# Patient Record
Sex: MALE | Race: ASIAN | NOT HISPANIC OR LATINO
[De-identification: names, ages, dates, MRNs, and addresses within clinical notes are randomized per-mention and may not be internally consistent; named-entity substitution may affect disease eponyms.]

---

## 2020-02-14 ENCOUNTER — APPOINTMENT (OUTPATIENT)
Dept: DERMATOLOGY | Facility: CLINIC | Age: 50
End: 2020-02-14
Payer: MEDICAID

## 2020-02-14 ENCOUNTER — APPOINTMENT (OUTPATIENT)
Dept: FAMILY MEDICINE | Facility: CLINIC | Age: 50
End: 2020-02-14
Payer: MEDICAID

## 2020-02-14 ENCOUNTER — NON-APPOINTMENT (OUTPATIENT)
Age: 50
End: 2020-02-14

## 2020-02-14 VITALS — DIASTOLIC BLOOD PRESSURE: 70 MMHG | SYSTOLIC BLOOD PRESSURE: 100 MMHG

## 2020-02-14 VITALS
RESPIRATION RATE: 16 BRPM | HEIGHT: 69 IN | DIASTOLIC BLOOD PRESSURE: 70 MMHG | SYSTOLIC BLOOD PRESSURE: 120 MMHG | HEART RATE: 74 BPM | WEIGHT: 142 LBS | BODY MASS INDEX: 21.03 KG/M2 | OXYGEN SATURATION: 98 %

## 2020-02-14 DIAGNOSIS — Z12.11 ENCOUNTER FOR SCREENING FOR MALIGNANT NEOPLASM OF COLON: ICD-10-CM

## 2020-02-14 DIAGNOSIS — Z78.9 OTHER SPECIFIED HEALTH STATUS: ICD-10-CM

## 2020-02-14 DIAGNOSIS — B35.3 TINEA PEDIS: ICD-10-CM

## 2020-02-14 DIAGNOSIS — Z12.5 ENCOUNTER FOR SCREENING FOR MALIGNANT NEOPLASM OF PROSTATE: ICD-10-CM

## 2020-02-14 DIAGNOSIS — Z82.3 FAMILY HISTORY OF STROKE: ICD-10-CM

## 2020-02-14 PROCEDURE — 99386 PREV VISIT NEW AGE 40-64: CPT | Mod: 25

## 2020-02-14 PROCEDURE — 90471 IMMUNIZATION ADMIN: CPT

## 2020-02-14 PROCEDURE — 90715 TDAP VACCINE 7 YRS/> IM: CPT

## 2020-02-14 PROCEDURE — 99202 OFFICE O/P NEW SF 15 MIN: CPT

## 2020-02-14 PROCEDURE — 93000 ELECTROCARDIOGRAM COMPLETE: CPT | Mod: 59

## 2020-02-14 PROCEDURE — G0444 DEPRESSION SCREEN ANNUAL: CPT

## 2020-02-14 NOTE — CONSULT LETTER
[Dear  ___] : Dear  [unfilled], [Consult Letter:] : I had the pleasure of evaluating your patient, [unfilled]. [Consult Closing:] : Thank you very much for allowing me to participate in the care of this patient.  If you have any questions, please do not hesitate to contact me. [Sincerely,] : Sincerely, [FreeTextEntry2] : Anitra Martinez DO [FreeTextEntry1] : He has fairly severe onychomycosis of the toenails as well as tinea pedis.\par \par Please see that chart note for further details and treatment plan. [FreeTextEntry3] : Jerson Hardy MD\par [address], [address]\par [place] [address]\par Tel ([phone])\par Fax ([phone])\par Private line ([phone])\par

## 2020-02-14 NOTE — PHYSICAL EXAM
[Face] : Face [Ears] : Ears [Nose] : Nose [Eyelids] : Eyelids [Lips] : Lips [FreeTextEntry3] : Soles:  Diffuse mild scaling, more prominent at the volar base of all toes\par Distal onycholysis with subungual debris present on all toenails except the left second and right fourth toenail \par Subungual hemorrhage is present on the left great toenail and right fifth toenail\par Distal onycholysis extends to 1 mm from the proximal nail fold of the right third toenail

## 2020-02-14 NOTE — HISTORY OF PRESENT ILLNESS
[de-identified] : First visit for 49-year-old [place]  male referred by Anitra Martinez DO with a long history of "toenail fungus." Currently being treated with 8% ciclopirox  lacquer without improvement. [FreeTextEntry1] : Toenail fungus

## 2020-02-18 LAB
25(OH)D3 SERPL-MCNC: 29.6 NG/ML
ALBUMIN SERPL ELPH-MCNC: 4.7 G/DL
ALP BLD-CCNC: 78 U/L
ALT SERPL-CCNC: 13 U/L
ANION GAP SERPL CALC-SCNC: 11 MMOL/L
APPEARANCE: CLEAR
AST SERPL-CCNC: 16 U/L
BACTERIA: NEGATIVE
BASOPHILS # BLD AUTO: 0.06 K/UL
BASOPHILS NFR BLD AUTO: 1 %
BILIRUB SERPL-MCNC: 0.6 MG/DL
BILIRUBIN URINE: NEGATIVE
BLOOD URINE: NEGATIVE
BUN SERPL-MCNC: 10 MG/DL
CALCIUM SERPL-MCNC: 9.5 MG/DL
CHLORIDE SERPL-SCNC: 102 MMOL/L
CHOLEST SERPL-MCNC: 183 MG/DL
CHOLEST/HDLC SERPL: 4.2 RATIO
CO2 SERPL-SCNC: 28 MMOL/L
COLOR: NORMAL
CREAT SERPL-MCNC: 0.92 MG/DL
EOSINOPHIL # BLD AUTO: 0.23 K/UL
EOSINOPHIL NFR BLD AUTO: 3.8 %
ESTIMATED AVERAGE GLUCOSE: 111 MG/DL
FOLATE SERPL-MCNC: 17.2 NG/ML
GLUCOSE QUALITATIVE U: NEGATIVE
GLUCOSE SERPL-MCNC: 91 MG/DL
HBA1C MFR BLD HPLC: 5.5 %
HCT VFR BLD CALC: 44.1 %
HDLC SERPL-MCNC: 43 MG/DL
HGB BLD-MCNC: 14.6 G/DL
HYALINE CASTS: 0 /LPF
IMM GRANULOCYTES NFR BLD AUTO: 0.3 %
KETONES URINE: NEGATIVE
LDLC SERPL CALC-MCNC: 114 MG/DL
LEUKOCYTE ESTERASE URINE: NEGATIVE
LYMPHOCYTES # BLD AUTO: 2.2 K/UL
LYMPHOCYTES NFR BLD AUTO: 36.6 %
MAN DIFF?: NORMAL
MCHC RBC-ENTMCNC: 29.7 PG
MCHC RBC-ENTMCNC: 33.1 GM/DL
MCV RBC AUTO: 89.8 FL
MICROSCOPIC-UA: NORMAL
MONOCYTES # BLD AUTO: 0.32 K/UL
MONOCYTES NFR BLD AUTO: 5.3 %
NEUTROPHILS # BLD AUTO: 3.18 K/UL
NEUTROPHILS NFR BLD AUTO: 53 %
NITRITE URINE: NEGATIVE
PH URINE: 6.5
PLATELET # BLD AUTO: 275 K/UL
POTASSIUM SERPL-SCNC: 4.5 MMOL/L
PROT SERPL-MCNC: 7.5 G/DL
PROTEIN URINE: NEGATIVE
PSA SERPL-MCNC: 1.04 NG/ML
RBC # BLD: 4.91 M/UL
RBC # FLD: 12.1 %
RED BLOOD CELLS URINE: 0 /HPF
SODIUM SERPL-SCNC: 141 MMOL/L
SPECIFIC GRAVITY URINE: 1.01
SQUAMOUS EPITHELIAL CELLS: 0 /HPF
T4 FREE SERPL-MCNC: 1.4 NG/DL
TRIGL SERPL-MCNC: 130 MG/DL
TSH SERPL-ACNC: 1.65 UIU/ML
URATE SERPL-MCNC: 5.5 MG/DL
UROBILINOGEN URINE: NORMAL
VIT B12 SERPL-MCNC: 686 PG/ML
WBC # FLD AUTO: 6.01 K/UL
WHITE BLOOD CELLS URINE: 0 /HPF

## 2020-02-18 NOTE — ASSESSMENT
[FreeTextEntry1] : check blood work- blood drawn in office \par follow up with optometry/ophthalmology and dentist for routine exams\par referred to colorectal surgery for colonoscopy \par c/w diet and exercise \par Tdap given, patient tolerated well, VIS given\par \par Onychomycosis of toenails- trial if Ciclopirox, see dermatology \par \par Herpes labialis- take Valtrex as directed when needed\par \par \par

## 2020-02-18 NOTE — PHYSICAL EXAM
[No Acute Distress] : no acute distress [Well Nourished] : well nourished [Well Developed] : well developed [Well-Appearing] : well-appearing [PERRL] : pupils equal round and reactive to light [Normal Sclera/Conjunctiva] : normal sclera/conjunctiva [Normal Oropharynx] : the oropharynx was normal [Neck Supple] : was supple [Normal Appearance] : was normal in appearance [Normal TMs] : both tympanic membranes were normal [Normal] : the thyroid was normal [Clear to Auscultation] : lungs were clear to auscultation bilaterally [No Respiratory Distress] : no respiratory distress  [Regular Rhythm] : with a regular rhythm [Normal Rate] : normal rate  [No Murmur] : no murmur heard [Normal S1, S2] : normal S1 and S2 [No Carotid Bruits] : no carotid bruits [No Edema] : there was no peripheral edema [Soft] : abdomen soft [Non Tender] : non-tender [Normal Bowel Sounds] : normal bowel sounds [Normal Anterior Cervical Nodes] : no anterior cervical lymphadenopathy [Normal Posterior Cervical Nodes] : no posterior cervical lymphadenopathy [No Spinal Tenderness] : no spinal tenderness [No Rash] : no rash [Grossly Normal Strength/Tone] : grossly normal strength/tone [No Focal Deficits] : no focal deficits [Alert and Oriented x3] : oriented to person, place, and time [Normal Affect] : the affect was normal [Normal Mood] : the mood was normal [de-identified] :  exam deferred [de-identified] : yellow discoloration to bilateral toenails

## 2020-02-18 NOTE — HEALTH RISK ASSESSMENT
[No] : In the past 12 months have you used drugs other than those required for medical reasons? No [0] : 2) Feeling down, depressed, or hopeless: Not at all (0) [Smoke Detector] : smoke detector [Carbon Monoxide Detector] : carbon monoxide detector [Sunscreen] : uses sunscreen [Seat Belt] :  uses seat belt [de-identified] : exercises regularly- plays soccer [] : No [de-identified] : diet is good

## 2020-02-18 NOTE — HISTORY OF PRESENT ILLNESS
[FreeTextEntry1] : Establish care [de-identified] : \par 49 year old male presents as a new patient to establish care, for annual physical. \par \par Currently- feels well \par \par concerned as his father had CVA in [place] about 20 years ago\par \par would like to go for colon cancer screening \par \par up to date with flu vaccination \par \par reports toe nail fungus\par \par reports h/o herpes labialis\par would like script for Valtrex- has taken previously for treatment \par \par due for Tdap, agrees for vaccination \par no previous vaccination reactions

## 2020-02-22 ENCOUNTER — APPOINTMENT (OUTPATIENT)
Dept: FAMILY MEDICINE | Facility: CLINIC | Age: 50
End: 2020-02-22
Payer: MEDICAID

## 2020-02-22 VITALS
OXYGEN SATURATION: 98 % | HEIGHT: 69 IN | BODY MASS INDEX: 21.03 KG/M2 | TEMPERATURE: 100.2 F | RESPIRATION RATE: 16 BRPM | HEART RATE: 82 BPM | DIASTOLIC BLOOD PRESSURE: 80 MMHG | SYSTOLIC BLOOD PRESSURE: 110 MMHG | WEIGHT: 142 LBS

## 2020-02-22 LAB
FLUAV SPEC QL CULT: POSITIVE
FLUBV AG SPEC QL IA: NORMAL

## 2020-02-22 PROCEDURE — 87804 INFLUENZA ASSAY W/OPTIC: CPT | Mod: 59,QW

## 2020-02-22 PROCEDURE — 99214 OFFICE O/P EST MOD 30 MIN: CPT | Mod: 25

## 2020-02-22 NOTE — ASSESSMENT
[FreeTextEntry1] : take Tamiflu as directed- can hold if any side effects occur\par alternate between Tylenol and Ibuprofen for fever, myalgias\par maintain fluid hydration\par return or call if symptoms worsen or don't improve

## 2020-02-22 NOTE — HISTORY OF PRESENT ILLNESS
[FreeTextEntry8] : \par 49 year old male presents c/o fever, chills, body aches since last night\par + sinus congestion, cough, sore throat\par no nausea, vomiting, or diarrhea\par did not check temp last night, temp here today 100.2\par has been taking Advil- last taken at 5 am \par did have the Flu vaccine

## 2020-02-22 NOTE — PHYSICAL EXAM
[No Acute Distress] : no acute distress [Well Nourished] : well nourished [Well Developed] : well developed [Normal Oropharynx] : the oropharynx was normal [Normal Appearance] : was normal in appearance [Normal TMs] : both tympanic membranes were normal [Neck Supple] : was supple [No Respiratory Distress] : no respiratory distress  [Clear to Auscultation] : lungs were clear to auscultation bilaterally [Normal Rate] : normal rate  [Normal S1, S2] : normal S1 and S2 [Regular Rhythm] : with a regular rhythm [Non Tender] : non-tender [Soft] : abdomen soft [No Murmur] : no murmur heard [Normal Posterior Cervical Nodes] : no posterior cervical lymphadenopathy [Normal Anterior Cervical Nodes] : no anterior cervical lymphadenopathy [de-identified] : Bilateral nasal congestion; No sinus tenderness; Postnasal drip present [Alert and Oriented x3] : oriented to person, place, and time

## 2020-02-22 NOTE — REVIEW OF SYSTEMS
[Fever] : fever [Chills] : chills [Cough] : cough [Sore Throat] : sore throat [Chest Pain] : no chest pain [Shortness Of Breath] : no shortness of breath [Nausea] : no nausea [Abdominal Pain] : no abdominal pain [Diarrhea] : no diarrhea [Vomiting] : no vomiting [FreeTextEntry4] : sinus congestion

## 2020-05-04 ENCOUNTER — TRANSCRIPTION ENCOUNTER (OUTPATIENT)
Age: 50
End: 2020-05-04

## 2020-05-04 ENCOUNTER — APPOINTMENT (OUTPATIENT)
Dept: FAMILY MEDICINE | Facility: CLINIC | Age: 50
End: 2020-05-04
Payer: MEDICAID

## 2020-05-04 PROCEDURE — 99212 OFFICE O/P EST SF 10 MIN: CPT | Mod: 95

## 2020-05-04 RX ORDER — OSELTAMIVIR PHOSPHATE 75 MG/1
75 CAPSULE ORAL TWICE DAILY
Qty: 10 | Refills: 0 | Status: COMPLETED | COMMUNITY
Start: 2020-02-22 | End: 2020-05-04

## 2020-05-04 RX ORDER — LANCETS 30 GAUGE
EACH MISCELLANEOUS
Qty: 50 | Refills: 0 | Status: COMPLETED | COMMUNITY
Start: 2020-05-04

## 2020-05-04 NOTE — HISTORY OF PRESENT ILLNESS
[Home] : at home, [unfilled] , at the time of the visit. [Medical Office: ([facility])___] : at the medical office located in  [Patient] : the patient [FreeTextEntry8] : Patient has a flareup of his seasonal allergies with sneezing runny nose eye irritation.  He requests allergy medication.  No fever no wheezing no breathing difficulties\par \par Allegra and Astelin prescribed to be used as needed\par \par face to face with patient for 10 minutes giving counseling and discussing Covid. Time also spent before and after visit reviewing chart\par

## 2020-05-06 ENCOUNTER — APPOINTMENT (OUTPATIENT)
Dept: FAMILY MEDICINE | Facility: CLINIC | Age: 50
End: 2020-05-06
Payer: MEDICAID

## 2020-05-06 DIAGNOSIS — T78.40XA ALLERGY, UNSPECIFIED, INITIAL ENCOUNTER: ICD-10-CM

## 2020-05-06 PROCEDURE — 99441: CPT

## 2020-05-07 ENCOUNTER — APPOINTMENT (OUTPATIENT)
Dept: FAMILY MEDICINE | Facility: CLINIC | Age: 50
End: 2020-05-07
Payer: MEDICAID

## 2020-05-07 VITALS
DIASTOLIC BLOOD PRESSURE: 64 MMHG | WEIGHT: 142 LBS | HEART RATE: 79 BPM | OXYGEN SATURATION: 99 % | HEIGHT: 60 IN | RESPIRATION RATE: 16 BRPM | BODY MASS INDEX: 27.88 KG/M2 | SYSTOLIC BLOOD PRESSURE: 104 MMHG

## 2020-05-07 PROCEDURE — 99212 OFFICE O/P EST SF 10 MIN: CPT

## 2020-05-07 RX ORDER — KETOROLAC TROMETHAMINE 4 MG/ML
0.4 SOLUTION/ DROPS OPHTHALMIC 4 TIMES DAILY
Qty: 1 | Refills: 1 | Status: DISCONTINUED | COMMUNITY
Start: 2020-05-06 | End: 2020-05-07

## 2020-05-07 NOTE — HISTORY OF PRESENT ILLNESS
[FreeTextEntry8] : mimimal poison ivy right arm and left leg\par \par Betamethasone.  benedryl allegra.  Etio of PI explained

## 2020-05-15 ENCOUNTER — TRANSCRIPTION ENCOUNTER (OUTPATIENT)
Age: 50
End: 2020-05-15

## 2020-05-24 ENCOUNTER — RX RENEWAL (OUTPATIENT)
Age: 50
End: 2020-05-24

## 2020-09-29 ENCOUNTER — APPOINTMENT (OUTPATIENT)
Dept: FAMILY MEDICINE | Facility: CLINIC | Age: 50
End: 2020-09-29
Payer: MEDICAID

## 2020-09-29 DIAGNOSIS — Z23 ENCOUNTER FOR IMMUNIZATION: ICD-10-CM

## 2020-09-29 PROCEDURE — 90686 IIV4 VACC NO PRSV 0.5 ML IM: CPT

## 2020-09-29 PROCEDURE — G0008: CPT

## 2021-05-05 ENCOUNTER — APPOINTMENT (OUTPATIENT)
Dept: FAMILY MEDICINE | Facility: CLINIC | Age: 51
End: 2021-05-05
Payer: MEDICAID

## 2021-05-05 ENCOUNTER — NON-APPOINTMENT (OUTPATIENT)
Age: 51
End: 2021-05-05

## 2021-05-05 VITALS
OXYGEN SATURATION: 98 % | SYSTOLIC BLOOD PRESSURE: 110 MMHG | HEIGHT: 68 IN | WEIGHT: 149 LBS | RESPIRATION RATE: 16 BRPM | BODY MASS INDEX: 22.58 KG/M2 | DIASTOLIC BLOOD PRESSURE: 80 MMHG | HEART RATE: 78 BPM | TEMPERATURE: 98.1 F

## 2021-05-05 DIAGNOSIS — L23.7 ALLERGIC CONTACT DERMATITIS DUE TO PLANTS, EXCEPT FOOD: ICD-10-CM

## 2021-05-05 DIAGNOSIS — J10.1 INFLUENZA DUE TO OTHER IDENTIFIED INFLUENZA VIRUS WITH OTHER RESPIRATORY MANIFESTATIONS: ICD-10-CM

## 2021-05-05 PROCEDURE — 93000 ELECTROCARDIOGRAM COMPLETE: CPT

## 2021-05-05 PROCEDURE — 99072 ADDL SUPL MATRL&STAF TM PHE: CPT

## 2021-05-05 PROCEDURE — 96127 BRIEF EMOTIONAL/BEHAV ASSMT: CPT

## 2021-05-05 PROCEDURE — 99396 PREV VISIT EST AGE 40-64: CPT | Mod: 25

## 2021-05-05 RX ORDER — FEXOFENADINE HCL 180 MG/1
180 TABLET, FILM COATED ORAL
Qty: 30 | Refills: 3 | Status: DISCONTINUED | COMMUNITY
Start: 2020-05-04 | End: 2021-05-05

## 2021-05-05 RX ORDER — OLOPATADINE HCL 1 MG/ML
0.1 SOLUTION/ DROPS OPHTHALMIC TWICE DAILY
Qty: 2 | Refills: 3 | Status: DISCONTINUED | COMMUNITY
Start: 2020-05-07 | End: 2021-05-05

## 2021-05-05 RX ORDER — CICLOPIROX 80 MG/ML
8 SOLUTION TOPICAL
Qty: 1 | Refills: 3 | Status: DISCONTINUED | COMMUNITY
Start: 2020-02-14 | End: 2021-05-05

## 2021-05-05 RX ORDER — BETAMETHASONE DIPROPIONATE 0.5 MG/G
0.05 CREAM, AUGMENTED TOPICAL TWICE DAILY
Qty: 50 | Refills: 1 | Status: DISCONTINUED | COMMUNITY
Start: 2020-05-06 | End: 2021-05-05

## 2021-05-05 NOTE — PHYSICAL EXAM
[No Acute Distress] : no acute distress [Well Nourished] : well nourished [Well Developed] : well developed [Well-Appearing] : well-appearing [Normal Sclera/Conjunctiva] : normal sclera/conjunctiva [PERRL] : pupils equal round and reactive to light [Normal Outer Ear/Nose] : the outer ears and nose were normal in appearance [Normal TMs] : both tympanic membranes were normal [Normal Appearance] : was normal in appearance [Neck Supple] : was supple [Normal] : the thyroid was normal [No Respiratory Distress] : no respiratory distress  [Clear to Auscultation] : lungs were clear to auscultation bilaterally [Normal Rate] : normal rate  [Regular Rhythm] : with a regular rhythm [Normal S1, S2] : normal S1 and S2 [No Murmur] : no murmur heard [No Carotid Bruits] : no carotid bruits [No Edema] : there was no peripheral edema [Soft] : abdomen soft [Non Tender] : non-tender [Normal Bowel Sounds] : normal bowel sounds [Normal Posterior Cervical Nodes] : no posterior cervical lymphadenopathy [Normal Anterior Cervical Nodes] : no anterior cervical lymphadenopathy [No Spinal Tenderness] : no spinal tenderness [Grossly Normal Strength/Tone] : grossly normal strength/tone [No Rash] : no rash [No Focal Deficits] : no focal deficits [Alert and Oriented x3] : oriented to person, place, and time [Anxious] : anxious

## 2021-05-05 NOTE — HISTORY OF PRESENT ILLNESS
[FreeTextEntry1] : Annual physical  [de-identified] : \par 50 year old male presents for annual physical \par \par reports awakening at night to urinate- about 3 times at night\par occurring over the past few months\par does not believe he is drinking much fluid before bedtime \par \par feeling down as he has not been sleeping well\par also feels anxious at times- mainly regarding his concerns for his health \par \par having tinnitus- seen by ENT\par \par due for colonoscopy

## 2021-05-05 NOTE — HEALTH RISK ASSESSMENT
[No] : In the past 12 months have you used drugs other than those required for medical reasons? No [] : No [de-identified] : Exercises regularly [de-identified] : Diet is good

## 2021-05-05 NOTE — ASSESSMENT
[FreeTextEntry1] : Health care maintenance:\par check blood work, blood drawn in office\par follow up with optometry/ophthalmology and dentist for routine exams when due\par referred to GI for colonoscopy\par c/w diet and exercise as tolerated\par \par Nocturia:\par see urology\par \par Anxiety/Depression:\par will refer to behavioral health counselor

## 2021-05-07 LAB
25(OH)D3 SERPL-MCNC: 36.3 NG/ML
ALBUMIN SERPL ELPH-MCNC: 4.5 G/DL
ALP BLD-CCNC: 77 U/L
ALT SERPL-CCNC: 19 U/L
ANION GAP SERPL CALC-SCNC: 11 MMOL/L
APPEARANCE: CLEAR
AST SERPL-CCNC: 18 U/L
BACTERIA: NEGATIVE
BASOPHILS # BLD AUTO: 0.05 K/UL
BASOPHILS NFR BLD AUTO: 1 %
BILIRUB SERPL-MCNC: 0.7 MG/DL
BILIRUBIN URINE: NEGATIVE
BLOOD URINE: NEGATIVE
BUN SERPL-MCNC: 9 MG/DL
CALCIUM SERPL-MCNC: 9.5 MG/DL
CHLORIDE SERPL-SCNC: 100 MMOL/L
CHOLEST SERPL-MCNC: 168 MG/DL
CO2 SERPL-SCNC: 27 MMOL/L
COLOR: YELLOW
CREAT SERPL-MCNC: 0.9 MG/DL
EOSINOPHIL # BLD AUTO: 0.14 K/UL
EOSINOPHIL NFR BLD AUTO: 2.8 %
ESTIMATED AVERAGE GLUCOSE: 108 MG/DL
FOLATE SERPL-MCNC: >20 NG/ML
GLUCOSE QUALITATIVE U: NEGATIVE
GLUCOSE SERPL-MCNC: 100 MG/DL
HBA1C MFR BLD HPLC: 5.4 %
HCT VFR BLD CALC: 42.7 %
HDLC SERPL-MCNC: 56 MG/DL
HGB BLD-MCNC: 14.5 G/DL
HYALINE CASTS: 0 /LPF
IMM GRANULOCYTES NFR BLD AUTO: 0.2 %
KETONES URINE: NEGATIVE
LDLC SERPL CALC-MCNC: 97 MG/DL
LEUKOCYTE ESTERASE URINE: NEGATIVE
LYMPHOCYTES # BLD AUTO: 2.07 K/UL
LYMPHOCYTES NFR BLD AUTO: 40.8 %
MAN DIFF?: NORMAL
MCHC RBC-ENTMCNC: 29.9 PG
MCHC RBC-ENTMCNC: 34 GM/DL
MCV RBC AUTO: 88 FL
MICROSCOPIC-UA: NORMAL
MONOCYTES # BLD AUTO: 0.33 K/UL
MONOCYTES NFR BLD AUTO: 6.5 %
NEUTROPHILS # BLD AUTO: 2.47 K/UL
NEUTROPHILS NFR BLD AUTO: 48.7 %
NITRITE URINE: NEGATIVE
NONHDLC SERPL-MCNC: 112 MG/DL
PH URINE: 7.5
PLATELET # BLD AUTO: 320 K/UL
POTASSIUM SERPL-SCNC: 4.8 MMOL/L
PROT SERPL-MCNC: 7.7 G/DL
PROTEIN URINE: NORMAL
PSA SERPL-MCNC: 0.71 NG/ML
RBC # BLD: 4.85 M/UL
RBC # FLD: 12.4 %
RED BLOOD CELLS URINE: 1 /HPF
SODIUM SERPL-SCNC: 138 MMOL/L
SPECIFIC GRAVITY URINE: 1.02
SQUAMOUS EPITHELIAL CELLS: 0 /HPF
T4 FREE SERPL-MCNC: 1.7 NG/DL
TRIGL SERPL-MCNC: 74 MG/DL
TSH SERPL-ACNC: 0.96 UIU/ML
URATE SERPL-MCNC: 4.4 MG/DL
UROBILINOGEN URINE: NORMAL
VIT B12 SERPL-MCNC: 604 PG/ML
WBC # FLD AUTO: 5.07 K/UL
WHITE BLOOD CELLS URINE: 0 /HPF

## 2021-05-10 ENCOUNTER — NON-APPOINTMENT (OUTPATIENT)
Age: 51
End: 2021-05-10

## 2021-05-10 ENCOUNTER — APPOINTMENT (OUTPATIENT)
Dept: FAMILY MEDICINE | Facility: CLINIC | Age: 51
End: 2021-05-10
Payer: MEDICAID

## 2021-05-10 VITALS
HEART RATE: 83 BPM | HEIGHT: 68 IN | WEIGHT: 149 LBS | TEMPERATURE: 97.6 F | OXYGEN SATURATION: 98 % | SYSTOLIC BLOOD PRESSURE: 130 MMHG | DIASTOLIC BLOOD PRESSURE: 70 MMHG | BODY MASS INDEX: 22.58 KG/M2

## 2021-05-10 DIAGNOSIS — M54.5 LOW BACK PAIN: ICD-10-CM

## 2021-05-10 PROCEDURE — 99072 ADDL SUPL MATRL&STAF TM PHE: CPT

## 2021-05-10 PROCEDURE — 99214 OFFICE O/P EST MOD 30 MIN: CPT

## 2021-05-10 NOTE — HISTORY OF PRESENT ILLNESS
[FreeTextEntry8] : \par c/o right sided low back pain since yesterday\par started after he lifted a  at his home\par pain is non-radiating\par pain is worse with movement\par no numbness or tingling \par tried Tylenol and Ibuprofen with mild improvement\par would like to go for physical therapy- requests referral \par \par started Sertraline 25 mg recently for anxiety/depression

## 2021-05-10 NOTE — PHYSICAL EXAM
[No Acute Distress] : no acute distress [Well Nourished] : well nourished [Well Developed] : well developed [Normal Appearance] : was normal in appearance [Neck Supple] : was supple [No Respiratory Distress] : no respiratory distress  [Clear to Auscultation] : lungs were clear to auscultation bilaterally [Normal Rate] : normal rate  [Regular Rhythm] : with a regular rhythm [Normal S1, S2] : normal S1 and S2 [No Murmur] : no murmur heard [Alert and Oriented x3] : oriented to person, place, and time [No Spinal Tenderness] : no spinal tenderness [Anxious] : anxious [de-identified] : tenderness to right lower back, lumbar region

## 2021-05-10 NOTE — ASSESSMENT
[FreeTextEntry1] : Acute right sided low back pain- c/w Tylenol and Ibuprofen when needed, referred for physical therapy, follow up if no improvement \par Anxiety/Depression- recently started on Sertraline 25 mg daily, advised to allow 2 weeks to see if symptoms improve, can increase to 50 mg at that time if needed

## 2021-05-28 ENCOUNTER — APPOINTMENT (OUTPATIENT)
Dept: FAMILY MEDICINE | Facility: CLINIC | Age: 51
End: 2021-05-28
Payer: MEDICAID

## 2021-05-28 VITALS
OXYGEN SATURATION: 99 % | WEIGHT: 151 LBS | HEIGHT: 68 IN | DIASTOLIC BLOOD PRESSURE: 70 MMHG | HEART RATE: 74 BPM | SYSTOLIC BLOOD PRESSURE: 112 MMHG | TEMPERATURE: 97.7 F | BODY MASS INDEX: 22.88 KG/M2

## 2021-05-28 PROCEDURE — 99213 OFFICE O/P EST LOW 20 MIN: CPT

## 2021-05-28 NOTE — PHYSICAL EXAM
[No Acute Distress] : no acute distress [Well Nourished] : well nourished [Well Developed] : well developed [Well-Appearing] : well-appearing [Normal Appearance] : was normal in appearance [Neck Supple] : was supple [No Respiratory Distress] : no respiratory distress  [Alert and Oriented x3] : oriented to person, place, and time [Normal Mood] : the mood was normal

## 2021-05-28 NOTE — ASSESSMENT
[FreeTextEntry1] : Anxiety/Depression- stable, c/w Sertraline 25 mg daily x 2-3 months, advised to f/u in 2 months to reassess, if patient would like to wean off of medication at that time, advised would take medication every other day x 1 week, then every 3 days x 1 week, then stop

## 2021-05-28 NOTE — HISTORY OF PRESENT ILLNESS
[FreeTextEntry1] : Follow up [de-identified] : \par 51 year old male presents for a follow up\par \par Reports his mood has improved since starting Sertraline 25 mg daily\par feels well\par wanted to discuss how long he should stay on medication

## 2021-06-09 ENCOUNTER — APPOINTMENT (OUTPATIENT)
Dept: FAMILY MEDICINE | Facility: CLINIC | Age: 51
End: 2021-06-09
Payer: MEDICAID

## 2021-06-09 VITALS
HEIGHT: 68 IN | BODY MASS INDEX: 23.04 KG/M2 | OXYGEN SATURATION: 98 % | WEIGHT: 152 LBS | RESPIRATION RATE: 16 BRPM | SYSTOLIC BLOOD PRESSURE: 100 MMHG | DIASTOLIC BLOOD PRESSURE: 72 MMHG | TEMPERATURE: 97.2 F | HEART RATE: 70 BPM

## 2021-06-09 PROCEDURE — 99213 OFFICE O/P EST LOW 20 MIN: CPT

## 2021-06-09 NOTE — HISTORY OF PRESENT ILLNESS
[FreeTextEntry8] : \par this morning- awoke with dizziness, felt like the room was spinning\par no head injury or trauma\par no headache\par has some associated nausea\par \par has ringing to his right ear\par has seen ENT\par had recent imaging to his head- MRI which he reports was normal\par scheduled to go for MRA

## 2021-06-09 NOTE — ASSESSMENT
[FreeTextEntry1] : Likely with benign positional vertigo \par Recommend Epley maneuvers\par Trial of Meclizine, take as directed at bedtime due to drowsiness, not to drive while on medication \par advised to change positions slowly, avoid sudden movements\par f/u with ENT if no improvement

## 2021-06-09 NOTE — PHYSICAL EXAM
[No Acute Distress] : no acute distress [Well Nourished] : well nourished [Well Developed] : well developed [Normal Sclera/Conjunctiva] : normal sclera/conjunctiva [PERRL] : pupils equal round and reactive to light [Normal TMs] : both tympanic membranes were normal [Normal Appearance] : was normal in appearance [Neck Supple] : was supple [No Respiratory Distress] : no respiratory distress  [Clear to Auscultation] : lungs were clear to auscultation bilaterally [Normal Rate] : normal rate  [Regular Rhythm] : with a regular rhythm [Normal S1, S2] : normal S1 and S2 [No Murmur] : no murmur heard [No Edema] : there was no peripheral edema [No Focal Deficits] : no focal deficits [Alert and Oriented x3] : oriented to person, place, and time

## 2021-06-18 ENCOUNTER — APPOINTMENT (OUTPATIENT)
Dept: UROLOGY | Facility: CLINIC | Age: 51
End: 2021-06-18

## 2021-06-23 ENCOUNTER — APPOINTMENT (OUTPATIENT)
Dept: FAMILY MEDICINE | Facility: CLINIC | Age: 51
End: 2021-06-23
Payer: MEDICAID

## 2021-06-23 VITALS
BODY MASS INDEX: 23.04 KG/M2 | HEART RATE: 67 BPM | WEIGHT: 152 LBS | SYSTOLIC BLOOD PRESSURE: 104 MMHG | HEIGHT: 68 IN | DIASTOLIC BLOOD PRESSURE: 72 MMHG | TEMPERATURE: 97.3 F | OXYGEN SATURATION: 99 %

## 2021-06-23 PROCEDURE — 99213 OFFICE O/P EST LOW 20 MIN: CPT

## 2021-06-23 NOTE — PHYSICAL EXAM
[No Acute Distress] : no acute distress [Well Nourished] : well nourished [Well Developed] : well developed [Normal Appearance] : was normal in appearance [Neck Supple] : was supple [No Respiratory Distress] : no respiratory distress  [No Spinal Tenderness] : no spinal tenderness [Grossly Normal Strength/Tone] : grossly normal strength/tone [No Rash] : no rash [Alert and Oriented x3] : oriented to person, place, and time [de-identified] : tenderness and muscle tightness to left posterior neck [de-identified] : mild tenderness and muscle tightness to left posterior neck and mid back- thoracic region

## 2021-06-23 NOTE — ASSESSMENT
[FreeTextEntry1] : posterior neck and back pain- likely muscular in etiology\par advised to take 600 mg of Advil every 6 hours as needed with food for pain control\par apply heat to affected areas\par will refer patient for x-rays\par will refer patient for physical therapy\par return or call if symptoms worsen or don't improve

## 2021-06-26 ENCOUNTER — APPOINTMENT (OUTPATIENT)
Dept: RADIOLOGY | Facility: CLINIC | Age: 51
End: 2021-06-26

## 2021-06-26 ENCOUNTER — RESULT REVIEW (OUTPATIENT)
Age: 51
End: 2021-06-26

## 2021-06-26 ENCOUNTER — OUTPATIENT (OUTPATIENT)
Dept: OUTPATIENT SERVICES | Facility: HOSPITAL | Age: 51
LOS: 1 days | End: 2021-06-26
Payer: MEDICAID

## 2021-06-26 DIAGNOSIS — M54.9 DORSALGIA, UNSPECIFIED: ICD-10-CM

## 2021-06-26 DIAGNOSIS — M54.2 CERVICALGIA: ICD-10-CM

## 2021-06-26 PROCEDURE — 72070 X-RAY EXAM THORAC SPINE 2VWS: CPT | Mod: 26

## 2021-06-26 PROCEDURE — 72050 X-RAY EXAM NECK SPINE 4/5VWS: CPT

## 2021-06-26 PROCEDURE — 72050 X-RAY EXAM NECK SPINE 4/5VWS: CPT | Mod: 26

## 2021-06-26 PROCEDURE — 72070 X-RAY EXAM THORAC SPINE 2VWS: CPT

## 2021-07-02 ENCOUNTER — APPOINTMENT (OUTPATIENT)
Dept: UROLOGY | Facility: CLINIC | Age: 51
End: 2021-07-02

## 2021-07-06 ENCOUNTER — APPOINTMENT (OUTPATIENT)
Dept: FAMILY MEDICINE | Facility: CLINIC | Age: 51
End: 2021-07-06
Payer: MEDICAID

## 2021-07-06 VITALS
WEIGHT: 157 LBS | DIASTOLIC BLOOD PRESSURE: 72 MMHG | HEART RATE: 57 BPM | TEMPERATURE: 97.7 F | HEIGHT: 68 IN | BODY MASS INDEX: 23.79 KG/M2 | SYSTOLIC BLOOD PRESSURE: 110 MMHG | OXYGEN SATURATION: 98 %

## 2021-07-06 DIAGNOSIS — M54.9 DORSALGIA, UNSPECIFIED: ICD-10-CM

## 2021-07-06 PROCEDURE — 99213 OFFICE O/P EST LOW 20 MIN: CPT

## 2021-07-09 ENCOUNTER — APPOINTMENT (OUTPATIENT)
Dept: PHYSICAL MEDICINE AND REHAB | Facility: CLINIC | Age: 51
End: 2021-07-09
Payer: MEDICAID

## 2021-07-09 VITALS
SYSTOLIC BLOOD PRESSURE: 109 MMHG | WEIGHT: 151 LBS | TEMPERATURE: 97 F | DIASTOLIC BLOOD PRESSURE: 71 MMHG | HEIGHT: 68 IN | RESPIRATION RATE: 14 BRPM | BODY MASS INDEX: 22.88 KG/M2 | HEART RATE: 73 BPM

## 2021-07-09 DIAGNOSIS — Z78.9 OTHER SPECIFIED HEALTH STATUS: ICD-10-CM

## 2021-07-09 PROCEDURE — 99204 OFFICE O/P NEW MOD 45 MIN: CPT

## 2021-07-09 RX ORDER — METHYLPREDNISOLONE 4 MG/1
4 TABLET ORAL
Qty: 1 | Refills: 0 | Status: DISCONTINUED | COMMUNITY
Start: 2021-07-06 | End: 2021-07-09

## 2021-07-09 RX ORDER — AZELASTINE HYDROCHLORIDE 137 UG/1
137 SPRAY, METERED NASAL TWICE DAILY
Qty: 1 | Refills: 2 | Status: DISCONTINUED | COMMUNITY
Start: 2020-05-04 | End: 2021-07-09

## 2021-07-09 RX ORDER — VALACYCLOVIR 1 G/1
1 TABLET, FILM COATED ORAL TWICE DAILY
Qty: 20 | Refills: 2 | Status: DISCONTINUED | COMMUNITY
Start: 2020-02-14 | End: 2021-07-09

## 2021-07-09 RX ORDER — MECLIZINE HYDROCHLORIDE 25 MG/1
25 TABLET ORAL
Qty: 14 | Refills: 0 | Status: DISCONTINUED | COMMUNITY
Start: 2021-06-09 | End: 2021-07-09

## 2021-07-09 NOTE — HISTORY OF PRESENT ILLNESS
[FreeTextEntry1] : 51 y.o. DARI DRUMMOND presents to office w/ c/o neck pain x 2 weeks.  Pt. denies antecedent trauma or injury to C-spine.  No pain radiating into left shoulder or arm.  Denies N/T in fingers.  PMD ordered C-spine x-ray reviewed below.  Pt. started taking oral prednisone.  Pt. states that he had one session of P.T.  No injections.

## 2021-07-09 NOTE — ASSESSMENT
[FreeTextEntry1] : 51 y.o. M w/ lower cervical axial pain suspicious for C8-T1 interspinous ligament sprain.  I spent most of today's visit (30 min) reviewing the patient's x-rays, discussing pathogenesis and further non-operative management.  Advised pt. to D/C oral steroids 2' lack of clinical efficacy and side effect profile.  Advised trial of OTC Voltaren gel as directed.  Rx P.T. for modalities, gentle ROM, stretching and strengthening exercises.  May consider US guided C8-T1 interspinous ligament injection if pt. is unable to advance his reheb program.  Pt. is in agreement with plan.  All questions answered.  RTC 6-8 weeks.

## 2021-07-09 NOTE — DATA REVIEWED
[Plain X-Rays] : plain X-Rays [FreeTextEntry1] : C-spine X-rays (6/26/21): No compression fractures or spondylolistheses.  Preserved intervertebral disc spaces.\par Narrowed bilateral C3-C4 and left C4-C5 neural foramina. Patent remaining visualized bilateral cervical neural foramina.  Prevertebral soft tissues and predental interval within normal limits.  Atlantoaxial and posterior facet alignment maintained.  Intact odontoid.  No discrete lytic or blastic lesions.

## 2021-07-09 NOTE — PHYSICAL EXAM
[FreeTextEntry1] : NAD\par A&Ox3\par Non-obese\par C-spine ROM: 1 FB chin-to-sternum; 20' extension; 45' LR either side\par Black's: neg\par Lhermitte's: neg\par Spurling's: neg\par DTR's: 1+ b/l B/T/Br\par MMT: 5/5 b/l UE\par Sensation: SILT\par Palpation: TTP C7-T1 INTERSPINOUS LIGAMENT; no discrete cervical myofascial trigger points\par

## 2021-07-15 PROBLEM — M54.9 ACUTE BACK PAIN: Status: ACTIVE | Noted: 2021-06-23

## 2021-07-15 NOTE — HISTORY OF PRESENT ILLNESS
[FreeTextEntry8] : \par remains with left sided neck and upper back pain\par went for a session of physical therapy- did not feel much of an improvement \par

## 2021-07-15 NOTE — PHYSICAL EXAM
[No Acute Distress] : no acute distress [Well Nourished] : well nourished [Well Developed] : well developed [Normal Appearance] : was normal in appearance [Neck Supple] : was supple [No Respiratory Distress] : no respiratory distress  [No Spinal Tenderness] : no spinal tenderness [No Rash] : no rash [Alert and Oriented x3] : oriented to person, place, and time [de-identified] : tenderness and muscle tightness to left posterior neck

## 2021-07-15 NOTE — ASSESSMENT
[FreeTextEntry1] : reviewed x-ray results\par trial of Medrol dose pack\par will refer patient to physiatry/orthopedic sports medicine specialist for further evaluation

## 2021-07-16 ENCOUNTER — APPOINTMENT (OUTPATIENT)
Dept: PHYSICAL MEDICINE AND REHAB | Facility: CLINIC | Age: 51
End: 2021-07-16
Payer: MEDICAID

## 2021-07-16 VITALS
BODY MASS INDEX: 23.34 KG/M2 | HEIGHT: 68 IN | TEMPERATURE: 97 F | SYSTOLIC BLOOD PRESSURE: 108 MMHG | RESPIRATION RATE: 14 BRPM | DIASTOLIC BLOOD PRESSURE: 70 MMHG | HEART RATE: 72 BPM | WEIGHT: 154 LBS

## 2021-07-16 PROCEDURE — 99214 OFFICE O/P EST MOD 30 MIN: CPT

## 2021-07-16 NOTE — HISTORY OF PRESENT ILLNESS
[FreeTextEntry1] : 51 y.o. M w/ h/o lower cervical axial pain suspicious for C8-T1 interspinous ligament sprain returns to office for f/u.  Pt. reports decreased neck pain but still has AM stiffness.  He describes tingling sensations over dorsum left hand.  + nocturnal exacerbations.  Denies radiating pain down left arm.

## 2021-07-16 NOTE — ASSESSMENT
[FreeTextEntry1] : 51 y.o. M w/ lower cervical axial pain suspicious for C8-T1 interspinous ligament sprain clinically improving now w/ N/T left hand.  I spent most of today's visit (25 min) discussing pathogenesis and further non-operative management.  Advised pt. to start P.T. for modalities, gentle ROM, stretching and strengthening exercises.  No need for US guided C8-T1 interspinous ligament injection now.  Discussed EMG/NCS LEFT UE to r/o peripheral entrapment neuropathy vs. cervical radiculopathy which patient has deferred.   All questions answered.  RTC 6-8 weeks.

## 2021-07-16 NOTE — PHYSICAL EXAM
[FreeTextEntry1] : NAD\par A&Ox3\par Non-obese\par No significant change in today's examination\par C-spine ROM: 1 FB chin-to-sternum; 20' extension; 45' LR either side\par Black's: neg\par Lhermitte's: neg\par Spurling's: neg\par DTR's: 1+ b/l B/T/Br\par MMT: 5/5 b/l UE including intrinsic muscles\par Sensation: SILT.  No decrement to PP testing C6-8 dermatomes\par Palpation: TTP C7-T1 INTERSPINOUS LIGAMENT; no discrete cervical myofascial trigger points\par Wrists\par No thenar muscle atrophy\par Phalen's neg\par Tinel's neg volar wrist and medial elbow

## 2021-07-19 ENCOUNTER — APPOINTMENT (OUTPATIENT)
Dept: FAMILY MEDICINE | Facility: CLINIC | Age: 51
End: 2021-07-19
Payer: MEDICAID

## 2021-07-19 VITALS
BODY MASS INDEX: 23.34 KG/M2 | HEART RATE: 71 BPM | HEIGHT: 68 IN | DIASTOLIC BLOOD PRESSURE: 72 MMHG | WEIGHT: 154 LBS | SYSTOLIC BLOOD PRESSURE: 110 MMHG | OXYGEN SATURATION: 98 % | TEMPERATURE: 97.9 F

## 2021-07-19 DIAGNOSIS — R20.0 ANESTHESIA OF SKIN: ICD-10-CM

## 2021-07-19 DIAGNOSIS — R20.2 ANESTHESIA OF SKIN: ICD-10-CM

## 2021-07-19 PROCEDURE — 99213 OFFICE O/P EST LOW 20 MIN: CPT

## 2021-07-19 NOTE — PHYSICAL EXAM
[No Acute Distress] : no acute distress [Well Nourished] : well nourished [Well Developed] : well developed [Normal Appearance] : was normal in appearance [Neck Supple] : was supple [No Respiratory Distress] : no respiratory distress  [No Spinal Tenderness] : no spinal tenderness [Grossly Normal Strength/Tone] : grossly normal strength/tone [Alert and Oriented x3] : oriented to person, place, and time [de-identified] : tenderness and muscle tightness to left poster neck

## 2021-07-19 NOTE — HISTORY OF PRESENT ILLNESS
[FreeTextEntry8] : \par c/o tingling sensation to his left hand as well as to the side of his left foot for the past 4-5 days\par remains with left sided neck pain\par has seen physiatry- recommended to go for physical therapy, EMG studies \par \par remains with ringing to his ears\par requests referral for ENT

## 2021-07-19 NOTE — ASSESSMENT
[FreeTextEntry1] : As patient remains with neck, upper back pain and c/o tingling sensation to his left hand and side of left foot, will refer patient for MRI of cervical spine\par advised to f/u with physiatry for EMG studies\par \par referral given for ENT for tinnitus \par

## 2021-07-26 ENCOUNTER — APPOINTMENT (OUTPATIENT)
Dept: UROLOGY | Facility: CLINIC | Age: 51
End: 2021-07-26
Payer: MEDICAID

## 2021-07-26 VITALS
SYSTOLIC BLOOD PRESSURE: 126 MMHG | TEMPERATURE: 98.1 F | HEART RATE: 68 BPM | DIASTOLIC BLOOD PRESSURE: 79 MMHG | WEIGHT: 154 LBS | HEIGHT: 68 IN | BODY MASS INDEX: 23.34 KG/M2

## 2021-07-26 PROCEDURE — 99204 OFFICE O/P NEW MOD 45 MIN: CPT

## 2021-07-26 PROCEDURE — 51798 US URINE CAPACITY MEASURE: CPT

## 2021-07-26 NOTE — PHYSICAL EXAM
[General Appearance - Well Developed] : well developed [General Appearance - Well Nourished] : well nourished [Normal Appearance] : normal appearance [Well Groomed] : well groomed [General Appearance - In No Acute Distress] : no acute distress [Edema] : no peripheral edema [Respiration, Rhythm And Depth] : normal respiratory rhythm and effort [Exaggerated Use Of Accessory Muscles For Inspiration] : no accessory muscle use [Abdomen Soft] : soft [Costovertebral Angle Tenderness] : no ~M costovertebral angle tenderness [Abdomen Tenderness] : non-tender [Urethral Meatus] : meatus normal [Penis Abnormality] : normal circumcised penis [Urinary Bladder Findings] : the bladder was normal on palpation [Scrotum] : the scrotum was normal [Testes Tenderness] : no tenderness of the testes [Testes Mass (___cm)] : there were no testicular masses [No Prostate Nodules] : no prostate nodules [Prostate Size ___ gm] : prostate size [unfilled] gm [Normal Station and Gait] : the gait and station were normal for the patient's age [] : no rash [No Focal Deficits] : no focal deficits [Oriented To Time, Place, And Person] : oriented to person, place, and time [Mood] : the mood was normal [Affect] : the affect was normal [Not Anxious] : not anxious [No Palpable Adenopathy] : no palpable adenopathy

## 2021-07-26 NOTE — ASSESSMENT
[FreeTextEntry1] : 51 year old M  with nocturia. For nocturia, we discussed that nocturia can be the result of nocturnal polyuria, global polyuria, or diminished bladder capacity either globally or nocturnally. The underlying cause of nocturia can be difficult to discern but include conditions that cause lower extremity edema such as CHF, venous insufficiency. Other causes include CHF, Diabetes mellitus, diabetes insipidus, and others. If any of these conditions are present, they should be addressed before urologic treatments are tried. Nocturia can be caused or exacerbated by BPH or OAB. If these are present, they can be treated. OAB and BPH treatments have been shown to only suboptimally treat nocturia, but should be attempted. We discussed that the best initial tool in the work up for nocturia is a voiding diary. Depending on the results of the diary, treatment options include medications for OAB such as anticholinergics or beta agonists, medications for BPH, behavioral modifications such as stopping fluid intake after 6 PM, or desmopressin to reduce nocturnal polyuria. \par \par Pt declines work up at this time. Reassured that this can be observed. He will RTO in 1 year or sooner PRN.

## 2021-07-26 NOTE — HISTORY OF PRESENT ILLNESS
[FreeTextEntry1] : 51 year old man seen 07/26/2021 with complaint of frequency, hesitancy, weak stream, straining to void, and nocturia 2-3x/night. This began months ago.  \par It is mild in severity. Nothing makes the symptoms better, nothing makes sx worse. \par It is associated with nothing. Pt is not overly bothered by symptoms, but wants to make sure "its nothing dangerous."\par No hematuria, no dysuria. No incontinence. \par No fevers, no chills, no nausea, no vomiting, no flank pain. \par \par No family history contributory to BPH with LUTS, nocturia.

## 2021-07-26 NOTE — REVIEW OF SYSTEMS
[Wake up at night to urinate  How many times?  ___] : wakes up to urinate [unfilled] times during the night [Joint Pain] : joint pain [Limb Weakness] : limb weakness [Negative] : Heme/Lymph

## 2021-07-27 LAB
APPEARANCE: CLEAR
BACTERIA: NEGATIVE
BILIRUBIN URINE: NEGATIVE
BLOOD URINE: NEGATIVE
COLOR: NORMAL
GLUCOSE QUALITATIVE U: NEGATIVE
HYALINE CASTS: 0 /LPF
KETONES URINE: NEGATIVE
LEUKOCYTE ESTERASE URINE: NEGATIVE
MICROSCOPIC-UA: NORMAL
NITRITE URINE: NEGATIVE
PH URINE: 6.5
PROTEIN URINE: NEGATIVE
RED BLOOD CELLS URINE: 0 /HPF
SPECIFIC GRAVITY URINE: 1.02
SQUAMOUS EPITHELIAL CELLS: 0 /HPF
UROBILINOGEN URINE: NORMAL
WHITE BLOOD CELLS URINE: 0 /HPF

## 2021-07-28 ENCOUNTER — APPOINTMENT (OUTPATIENT)
Dept: MRI IMAGING | Facility: CLINIC | Age: 51
End: 2021-07-28

## 2021-07-28 LAB — BACTERIA UR CULT: NORMAL

## 2021-07-30 ENCOUNTER — APPOINTMENT (OUTPATIENT)
Dept: PHYSICAL MEDICINE AND REHAB | Facility: CLINIC | Age: 51
End: 2021-07-30
Payer: MEDICAID

## 2021-07-30 VITALS
RESPIRATION RATE: 14 BRPM | DIASTOLIC BLOOD PRESSURE: 74 MMHG | BODY MASS INDEX: 23.34 KG/M2 | WEIGHT: 154 LBS | SYSTOLIC BLOOD PRESSURE: 120 MMHG | HEIGHT: 68 IN | TEMPERATURE: 97 F | HEART RATE: 88 BPM

## 2021-07-30 PROCEDURE — 95908 NRV CNDJ TST 3-4 STUDIES: CPT

## 2021-07-30 PROCEDURE — 95886 MUSC TEST DONE W/N TEST COMP: CPT | Mod: LT

## 2021-08-02 ENCOUNTER — APPOINTMENT (OUTPATIENT)
Dept: OTOLARYNGOLOGY | Facility: CLINIC | Age: 51
End: 2021-08-02
Payer: MEDICAID

## 2021-08-02 VITALS
DIASTOLIC BLOOD PRESSURE: 81 MMHG | HEART RATE: 78 BPM | BODY MASS INDEX: 22.81 KG/M2 | WEIGHT: 154 LBS | SYSTOLIC BLOOD PRESSURE: 119 MMHG | HEIGHT: 69 IN | TEMPERATURE: 98 F

## 2021-08-02 DIAGNOSIS — J32.0 CHRONIC MAXILLARY SINUSITIS: ICD-10-CM

## 2021-08-02 PROCEDURE — 99204 OFFICE O/P NEW MOD 45 MIN: CPT | Mod: 25

## 2021-08-02 PROCEDURE — 92557 COMPREHENSIVE HEARING TEST: CPT

## 2021-08-02 PROCEDURE — 31231 NASAL ENDOSCOPY DX: CPT

## 2021-08-02 PROCEDURE — 92567 TYMPANOMETRY: CPT

## 2021-08-02 NOTE — HISTORY OF PRESENT ILLNESS
[de-identified] : c/o problem with ringing in ears - started 3-4 mos .  Problem bilateral.  Sounds like wizzing noise.   No change in hearing.  No other ear problems .  No balance issues.  Occ clicking with swallowing.  Sound improves with turning head to right.  Had negative MRA and MRV.  Occ hears pulsing in ears

## 2021-08-02 NOTE — DATA REVIEWED
[de-identified] : Hearing is within normal limits, bilaterally.\par Type A  tympanograms-normal middle ear function, bilaterally.

## 2021-08-02 NOTE — ASSESSMENT
[FreeTextEntry1] : Patient with hx of tinnitus.  Changes with head positions and occ goes away with turning head.  Hx of negative MRA and MRV.  Does have some congestion around right ET .   Can try otc flonase but doubt helpful.\par Concern for vascular issue with change in head position -   will have patient follow up with Dr Godinez - may need to consider possible inteventional radiologic eval as well.   No evidence of sinusitis \par

## 2021-08-27 ENCOUNTER — APPOINTMENT (OUTPATIENT)
Dept: FAMILY MEDICINE | Facility: CLINIC | Age: 51
End: 2021-08-27

## 2021-08-27 VITALS
HEIGHT: 69 IN | WEIGHT: 154 LBS | SYSTOLIC BLOOD PRESSURE: 102 MMHG | OXYGEN SATURATION: 98 % | HEART RATE: 73 BPM | DIASTOLIC BLOOD PRESSURE: 70 MMHG | TEMPERATURE: 97.3 F | BODY MASS INDEX: 22.81 KG/M2

## 2021-08-27 RX ORDER — TRAMADOL HYDROCHLORIDE 50 MG/1
50 TABLET, COATED ORAL
Qty: 14 | Refills: 0 | Status: DISCONTINUED | COMMUNITY
Start: 2021-07-27 | End: 2021-08-27

## 2021-08-30 ENCOUNTER — APPOINTMENT (OUTPATIENT)
Dept: ULTRASOUND IMAGING | Facility: CLINIC | Age: 51
End: 2021-08-30
Payer: MEDICAID

## 2021-08-30 ENCOUNTER — OUTPATIENT (OUTPATIENT)
Dept: OUTPATIENT SERVICES | Facility: HOSPITAL | Age: 51
LOS: 1 days | End: 2021-08-30
Payer: MEDICAID

## 2021-08-30 DIAGNOSIS — H93.19 TINNITUS, UNSPECIFIED EAR: ICD-10-CM

## 2021-08-30 PROCEDURE — 93880 EXTRACRANIAL BILAT STUDY: CPT

## 2021-08-30 PROCEDURE — 93880 EXTRACRANIAL BILAT STUDY: CPT | Mod: 26

## 2021-09-13 ENCOUNTER — APPOINTMENT (OUTPATIENT)
Dept: OTOLARYNGOLOGY | Facility: CLINIC | Age: 51
End: 2021-09-13
Payer: MEDICAID

## 2021-09-13 VITALS
HEIGHT: 69 IN | DIASTOLIC BLOOD PRESSURE: 84 MMHG | TEMPERATURE: 97.2 F | OXYGEN SATURATION: 98 % | HEART RATE: 64 BPM | SYSTOLIC BLOOD PRESSURE: 126 MMHG | BODY MASS INDEX: 22.22 KG/M2 | WEIGHT: 150 LBS

## 2021-09-13 DIAGNOSIS — H93.293 OTHER ABNORMAL AUDITORY PERCEPTIONS, BILATERAL: ICD-10-CM

## 2021-09-13 DIAGNOSIS — R51.9 HEADACHE, UNSPECIFIED: ICD-10-CM

## 2021-09-13 DIAGNOSIS — H90.3 SENSORINEURAL HEARING LOSS, BILATERAL: ICD-10-CM

## 2021-09-13 PROCEDURE — 92550 TYMPANOMETRY & REFLEX THRESH: CPT

## 2021-09-13 PROCEDURE — 99214 OFFICE O/P EST MOD 30 MIN: CPT | Mod: 25

## 2021-09-13 PROCEDURE — 92625 TINNITUS ASSESSMENT: CPT

## 2021-09-13 PROCEDURE — 92504 EAR MICROSCOPY EXAMINATION: CPT

## 2021-09-13 PROCEDURE — 92557 COMPREHENSIVE HEARING TEST: CPT

## 2021-09-13 NOTE — DATA REVIEWED
[de-identified] : C/o: Tinnitus AU\par -Type A tymp AD, Type Ad tymp AS\par -Results obtained via insert earphones revealed hearing -8000 Hz, AU \par *Pitch match: 40 dB at 8kHz using NB noise (with fair reliability)\par *Extended high frequency testing revealed present and symmetrical responses 9k-12.5kHz AU\par Recs: 1)ENT f/u 2)Re-eval as per MD

## 2021-09-23 ENCOUNTER — APPOINTMENT (OUTPATIENT)
Dept: NEUROSURGERY | Facility: CLINIC | Age: 51
End: 2021-09-23
Payer: MEDICAID

## 2021-09-23 DIAGNOSIS — H93.13 TINNITUS, BILATERAL: ICD-10-CM

## 2021-09-23 PROCEDURE — 99203 OFFICE O/P NEW LOW 30 MIN: CPT

## 2021-09-23 RX ORDER — AMOXICILLIN 875 MG/1
875 TABLET, FILM COATED ORAL
Qty: 20 | Refills: 0 | Status: DISCONTINUED | COMMUNITY
Start: 2021-04-30

## 2021-09-23 RX ORDER — PREDNISOLONE ACETATE 10 MG/ML
1 SUSPENSION/ DROPS OPHTHALMIC
Qty: 10 | Refills: 0 | Status: DISCONTINUED | COMMUNITY
Start: 2021-04-30

## 2021-09-23 RX ORDER — FAMOTIDINE 20 MG/1
20 TABLET, FILM COATED ORAL
Qty: 30 | Refills: 0 | Status: DISCONTINUED | COMMUNITY
Start: 2021-05-27

## 2021-09-23 RX ORDER — OMEPRAZOLE 20 MG/1
20 CAPSULE, DELAYED RELEASE ORAL
Qty: 30 | Refills: 0 | Status: DISCONTINUED | COMMUNITY
Start: 2021-04-30

## 2021-09-23 RX ORDER — AZITHROMYCIN 250 MG/1
250 TABLET, FILM COATED ORAL
Qty: 6 | Refills: 0 | Status: DISCONTINUED | COMMUNITY
Start: 2021-06-07

## 2021-09-23 RX ORDER — IBUPROFEN 800 MG/1
800 TABLET, FILM COATED ORAL
Qty: 25 | Refills: 0 | Status: DISCONTINUED | COMMUNITY
Start: 2021-06-07

## 2021-09-23 RX ORDER — SERTRALINE 25 MG/1
25 TABLET, FILM COATED ORAL
Qty: 30 | Refills: 0 | Status: DISCONTINUED | COMMUNITY
Start: 2021-06-02

## 2021-09-23 RX ORDER — SODIUM SULFATE, MAGNESIUM SULFATE, AND POTASSIUM CHLORIDE 17.75; 2.7; 2.25 G/1; G/1; G/1
1479-225-188 TABLET ORAL
Qty: 24 | Refills: 0 | Status: DISCONTINUED | COMMUNITY
Start: 2021-06-29

## 2021-09-23 RX ORDER — FLUTICASONE PROPIONATE 50 UG/1
50 SPRAY, METERED NASAL
Qty: 16 | Refills: 0 | Status: DISCONTINUED | COMMUNITY
Start: 2021-04-07

## 2021-09-23 NOTE — REASON FOR VISIT
[New Patient Visit] : a new patient visit [Referred By: _________] : Patient was referred by MACIEJ [FreeTextEntry1] : Pulsatile Tinnitus

## 2021-09-23 NOTE — HISTORY OF PRESENT ILLNESS
[de-identified] : Mr. JOSE DOTY is a pleasant 51 year old male who presents today with a chief complaint of bilateral ear pulsatile tinnitus.  It first started a couple of months ago.  Since that time it has been getting progressively worse.  It is described as an intermittent ringing sound with occasional "wind" sound.  He states upon questioning that the sound is never actually pulsatile. Putting his chin to his chest makes the sounds stop. Yawning/looking up makes it worse.  He has been seen by multiple ENTs in the past and had a normal hearing test as per patient.  He has neck pain.  He denies any headaches, blurry vision or diplopia.\par \par He also states when he wakes up in the morning he feels "a vein pounding in front of his left ear pounding".\par \par How much is pulsatile tinnitus affecting your quality of life (0-10, 10 worst)? 6\par

## 2021-09-23 NOTE — ASSESSMENT
[FreeTextEntry1] : Impression:\par Non-Pulsatile Tinnitus\par MRA Head and Neck without abnormalities\par We discussed the difference between pulsatile and non-pulsatile tinnitus\par \par PLAN\par Nothing to offer to the patient\par

## 2021-09-23 NOTE — REVIEW OF SYSTEMS
[As Noted in HPI] : as noted in HPI [Negative] : Heme/Lymph [FreeTextEntry4] : Non-pulsatile tinnitus

## 2021-09-23 NOTE — PHYSICAL EXAM
[General Appearance - Alert] : alert [General Appearance - In No Acute Distress] : in no acute distress [General Appearance - Well-Appearing] : healthy appearing [Oriented To Time, Place, And Person] : oriented to person, place, and time [Person] : oriented to person [Place] : oriented to place [Time] : oriented to time [Sclera] : the sclera and conjunctiva were normal [Outer Ear] : the ears and nose were normal in appearance [Neck Appearance] : the appearance of the neck was normal [] : no respiratory distress [Heart Rate And Rhythm] : heart rate was normal and rhythm regular [Abnormal Walk] : normal gait [Skin Color & Pigmentation] : normal skin color and pigmentation

## 2021-09-24 ENCOUNTER — APPOINTMENT (OUTPATIENT)
Dept: FAMILY MEDICINE | Facility: CLINIC | Age: 51
End: 2021-09-24
Payer: MEDICAID

## 2021-09-24 VITALS
HEART RATE: 83 BPM | SYSTOLIC BLOOD PRESSURE: 104 MMHG | WEIGHT: 152.5 LBS | OXYGEN SATURATION: 99 % | TEMPERATURE: 97.9 F | DIASTOLIC BLOOD PRESSURE: 62 MMHG | BODY MASS INDEX: 22.59 KG/M2 | HEIGHT: 69 IN

## 2021-09-24 PROCEDURE — 99213 OFFICE O/P EST LOW 20 MIN: CPT

## 2021-10-01 ENCOUNTER — APPOINTMENT (OUTPATIENT)
Dept: PHYSICAL MEDICINE AND REHAB | Facility: CLINIC | Age: 51
End: 2021-10-01

## 2021-10-04 NOTE — ASSESSMENT
[FreeTextEntry1] : will refer patient for MRI of cervical spine\par would continue with physical therapy for now \par follow up with physiatrist

## 2021-10-04 NOTE — HISTORY OF PRESENT ILLNESS
[FreeTextEntry1] : Follow up [de-identified] : \par 51 year old male presents for a follow up\par \par remains with posterior neck and upper back pain\par now reports a tingling sensation towards the back of his head for the past month\par has gone for physical therapy with mild improvement\par \par seen recently by both ENT and neurosurgery as he remains with tinnitus \par \par off of medication for anxiety\par mood has improved\par

## 2021-10-04 NOTE — PHYSICAL EXAM
[Well Nourished] : well nourished [No Acute Distress] : no acute distress [Well Developed] : well developed [Well-Appearing] : well-appearing [Normal Appearance] : was normal in appearance [Neck Supple] : was supple [No Respiratory Distress] : no respiratory distress  [No Accessory Muscle Use] : no accessory muscle use [No Spinal Tenderness] : no spinal tenderness [Grossly Normal Strength/Tone] : grossly normal strength/tone [Alert and Oriented x3] : oriented to person, place, and time [de-identified] : tenderness to left side of posterior neck and upper back

## 2021-10-09 ENCOUNTER — OUTPATIENT (OUTPATIENT)
Dept: OUTPATIENT SERVICES | Facility: HOSPITAL | Age: 51
LOS: 1 days | End: 2021-10-09
Payer: MEDICAID

## 2021-10-09 ENCOUNTER — APPOINTMENT (OUTPATIENT)
Dept: MRI IMAGING | Facility: CLINIC | Age: 51
End: 2021-10-09
Payer: MEDICAID

## 2021-10-09 DIAGNOSIS — M54.2 CERVICALGIA: ICD-10-CM

## 2021-10-09 PROCEDURE — 72141 MRI NECK SPINE W/O DYE: CPT | Mod: 26

## 2021-10-09 PROCEDURE — 72141 MRI NECK SPINE W/O DYE: CPT

## 2021-10-14 ENCOUNTER — TRANSCRIPTION ENCOUNTER (OUTPATIENT)
Age: 51
End: 2021-10-14

## 2021-10-15 ENCOUNTER — APPOINTMENT (OUTPATIENT)
Dept: PHYSICAL MEDICINE AND REHAB | Facility: CLINIC | Age: 51
End: 2021-10-15
Payer: MEDICAID

## 2021-10-15 VITALS
BODY MASS INDEX: 22.36 KG/M2 | DIASTOLIC BLOOD PRESSURE: 77 MMHG | SYSTOLIC BLOOD PRESSURE: 111 MMHG | HEIGHT: 69 IN | WEIGHT: 151 LBS | HEART RATE: 80 BPM | RESPIRATION RATE: 14 BRPM

## 2021-10-15 DIAGNOSIS — M54.2 CERVICALGIA: ICD-10-CM

## 2021-10-15 PROCEDURE — 99214 OFFICE O/P EST MOD 30 MIN: CPT | Mod: 25

## 2021-10-15 PROCEDURE — 20552 NJX 1/MLT TRIGGER POINT 1/2: CPT

## 2021-10-15 NOTE — ASSESSMENT
[FreeTextEntry1] : 51 y.o. M w/ h/o left periscapular pain, mild cervical spondylosis and mild L UNE.  I spent most of today's visit (30 min) reviewing the patient's EMG, MRI C-spine, as well as discussing pathogenesis and further non-operative management.  Left lev scap m origin TPI successfully administered.  May consider US evaluation UN left elbow to r/o pathological swelling if paresthesias persist or if patient develops weakness in hand.  MRI C-spine not concordant with patient's pain as majority of spondylotic and degenerative changes are right sided.  No role for KATHERINE now.  Advised pt. to c/w P.T. as previously directed.  RTC 6-8 weeks.

## 2021-10-15 NOTE — PHYSICAL EXAM
[FreeTextEntry1] : NAD\par A&Ox3\par Non-obese\par No significant change in today's examination\par C-spine ROM: 1 FB chin-to-sternum; 20' extension; 45' LR either side\par Black's: neg\par Lhermitte's: neg\par Spurling's: neg\par DTR's: 1+ b/l B/T/Br\par MMT: 5/5 b/l UE including intrinsic muscles\par Sensation: SILT.  No decrement to PP testing C6-8 dermatomes\par Palpation: TTP C7-T1 INTERSPINOUS LIGAMENT; no discrete cervical myofascial trigger points; left lev scap origin VTTP\par Wrists\par No thenar muscle atrophy\par Phalen's neg\par Tinel's neg volar wrist and medial elbow

## 2021-10-15 NOTE — PROCEDURE
[de-identified] : Reason for procedure: LEFT LEVATOR SCAP MUSCLE ORIGIN PAIN\par \par Procedure: Trigger Point Injection/s\par Physician: DARCY LUKE DO\par Medication injected: 20 mg Kenalog, 1.0 Lidocaine 1%\par Sedation medications: None\par Estimated blood loss: None\par Complications: None\par \par Technique: R/B/A to trigger point injection reviewed with patient. The patient is agreeable and wishes to proceed.  Signed consent form to be scanned into EMR.  The patient was placed in seated position and the trigger point of left levator scap muscle was identified. The area was prepped in normal sterile fashion with Chloroprep x3  . A 27 gauge, 1.25 inch needle was advanced into the palpable trigger point with reproduction of pain. After negative aspiration of heme, the above medications were injected into the trigger area. Needle was then removed, bandaid placed over injection site. There was no complications.  The patient was provided with post injection instructions and noted improvement in pain and ROM after injection. \par \par

## 2021-10-15 NOTE — DATA REVIEWED
[Plain X-Rays] : plain X-Rays [FreeTextEntry1] : MRI C-spine (October 2021): C1/2: No spinal canal narrowing. No subluxation.  C2/C3: No spinal canal or foraminal narrowing.  C3/C4: Disc bulge and bilateral uncovertebral spurring, right greater than left, resulting moderate to severe right foraminal narrowing. No spinal canal or left foraminal narrowing.  C4/C5: No spinal canal or foraminal narrowing.  C5/C6: Mild right uncovertebral spurring and right facet arthrosis resulting in moderate right foraminal narrowing. No spinal canal or left foraminal narrowing.  C6/C7: No spinal canal or foraminal narrowing.  C7/T1: No spinal canal or foraminal narrowing.\par \par ALIGNMENT: Preservation of the vertebral body heights. There is trace retrolisthesis of C3 on C4 and C4 on C5 and C5 on C6.  CORD: No cord signal abnormality.  MARROW: Low T1 signal intensity of the marrow, favored to be secondary to technique on 3T magnet. Scattered hemangiomas are present. No marrow edema.  IMAGED BRAIN: Unremarkable.  PERIPHERAL/NECK SOFT TISSUES: Unremarkable.\par \par EMG/NCS LEFT UE (7/30/21):  Results consistent with (1) minimal, relative slowing of median, sensory NCV across the left wrist which does not meet strict criteria for median mononeuropathy; (2) mild, demyelinating, motor, ulnar mononeuropathy across the left elbow. There is no EDX evidence to support a concomitant cervical radiculopathy affecting the motor axons. \par \par C-spine X-rays (6/26/21): No compression fractures or spondylolistheses.  Preserved intervertebral disc spaces.\par Narrowed bilateral C3-C4 and left C4-C5 neural foramina. Patent remaining visualized bilateral cervical neural foramina.  Prevertebral soft tissues and predental interval within normal limits.  Atlantoaxial and posterior facet alignment maintained.  Intact odontoid.  No discrete lytic or blastic lesions.

## 2021-10-15 NOTE — HISTORY OF PRESENT ILLNESS
[FreeTextEntry1] : 51 y.o. M w/ lower cervical axial pain suspicious for C8-T1 interspinous ligament sprain clinically improving now w/ N/T left hand returns to office for f/u and EMG review.  Results detailed below.

## 2021-10-29 ENCOUNTER — APPOINTMENT (OUTPATIENT)
Dept: FAMILY MEDICINE | Facility: CLINIC | Age: 51
End: 2021-10-29
Payer: MEDICAID

## 2021-10-29 ENCOUNTER — MED ADMIN CHARGE (OUTPATIENT)
Age: 51
End: 2021-10-29

## 2021-10-29 PROCEDURE — G0008: CPT

## 2021-10-29 PROCEDURE — 90686 IIV4 VACC NO PRSV 0.5 ML IM: CPT

## 2022-02-08 ENCOUNTER — NON-APPOINTMENT (OUTPATIENT)
Age: 52
End: 2022-02-08

## 2022-02-08 ENCOUNTER — APPOINTMENT (OUTPATIENT)
Dept: FAMILY MEDICINE | Facility: CLINIC | Age: 52
End: 2022-02-08
Payer: COMMERCIAL

## 2022-02-08 VITALS
TEMPERATURE: 97.9 F | HEIGHT: 69 IN | SYSTOLIC BLOOD PRESSURE: 110 MMHG | WEIGHT: 158 LBS | BODY MASS INDEX: 23.4 KG/M2 | HEART RATE: 83 BPM | OXYGEN SATURATION: 98 % | DIASTOLIC BLOOD PRESSURE: 74 MMHG

## 2022-02-08 DIAGNOSIS — Z23 ENCOUNTER FOR IMMUNIZATION: ICD-10-CM

## 2022-02-08 PROCEDURE — 96127 BRIEF EMOTIONAL/BEHAV ASSMT: CPT

## 2022-02-08 PROCEDURE — 99396 PREV VISIT EST AGE 40-64: CPT | Mod: 25

## 2022-02-08 PROCEDURE — 93000 ELECTROCARDIOGRAM COMPLETE: CPT

## 2022-02-08 NOTE — HISTORY OF PRESENT ILLNESS
[FreeTextEntry1] : Annual physical  [de-identified] : \par 51 year old male presents for an annual physical \par \par reports seeing a "zig zag line" recently, stayed for 20-30 minutes and then resolved\par no associated headache\par was seen by optometry- reports eye exam was normal\par was concerned about possibility of having an ocular migraine and wants to see neurology \par \par reports occasional palpitations \par \par scheduled to have a colonoscopy in March 2022 \par

## 2022-02-08 NOTE — ASSESSMENT
[FreeTextEntry1] : \par check blood work, blood drawn in office\par follow up with optometry/ophthalmology and dentist for routine exams when due\par scheduled for a colonoscopy \par referred to neurology \par referred to cardiology for cardiac screening \par c/w diet and exercise as tolerated\par up to date with Flu vaccine\par

## 2022-02-08 NOTE — HEALTH RISK ASSESSMENT
[Never] : Never [No] : In the past 12 months have you used drugs other than those required for medical reasons? No [0] : 2) Feeling down, depressed, or hopeless: Not at all (0) [PHQ-2 Negative - No further assessment needed] : PHQ-2 Negative - No further assessment needed [de-identified] : Exercises regularly  [de-identified] : Diet is good [GSE0Duemc] : 0

## 2022-02-09 LAB
25(OH)D3 SERPL-MCNC: 60.2 NG/ML
ALBUMIN SERPL ELPH-MCNC: 4.7 G/DL
ALP BLD-CCNC: 105 U/L
ALT SERPL-CCNC: 29 U/L
ANION GAP SERPL CALC-SCNC: 13 MMOL/L
APPEARANCE: CLEAR
AST SERPL-CCNC: 23 U/L
BACTERIA: NEGATIVE
BASOPHILS # BLD AUTO: 0.04 K/UL
BASOPHILS NFR BLD AUTO: 0.8 %
BILIRUB SERPL-MCNC: 0.4 MG/DL
BILIRUBIN URINE: NEGATIVE
BLOOD URINE: NEGATIVE
BUN SERPL-MCNC: 17 MG/DL
CALCIUM SERPL-MCNC: 9.3 MG/DL
CHLORIDE SERPL-SCNC: 103 MMOL/L
CHOLEST SERPL-MCNC: 196 MG/DL
CO2 SERPL-SCNC: 24 MMOL/L
COLOR: NORMAL
CREAT SERPL-MCNC: 0.88 MG/DL
EOSINOPHIL # BLD AUTO: 0.15 K/UL
EOSINOPHIL NFR BLD AUTO: 3.1 %
ESTIMATED AVERAGE GLUCOSE: 108 MG/DL
FOLATE SERPL-MCNC: 17.9 NG/ML
GLUCOSE QUALITATIVE U: NEGATIVE
GLUCOSE SERPL-MCNC: 88 MG/DL
HBA1C MFR BLD HPLC: 5.4 %
HCT VFR BLD CALC: 44 %
HDLC SERPL-MCNC: 48 MG/DL
HGB BLD-MCNC: 14.3 G/DL
HYALINE CASTS: 0 /LPF
IMM GRANULOCYTES NFR BLD AUTO: 0 %
KETONES URINE: NEGATIVE
LDLC SERPL CALC-MCNC: 124 MG/DL
LEUKOCYTE ESTERASE URINE: NEGATIVE
LYMPHOCYTES # BLD AUTO: 2.4 K/UL
LYMPHOCYTES NFR BLD AUTO: 49.3 %
MAN DIFF?: NORMAL
MCHC RBC-ENTMCNC: 29.3 PG
MCHC RBC-ENTMCNC: 32.5 GM/DL
MCV RBC AUTO: 90.2 FL
MICROSCOPIC-UA: NORMAL
MONOCYTES # BLD AUTO: 0.27 K/UL
MONOCYTES NFR BLD AUTO: 5.5 %
NEUTROPHILS # BLD AUTO: 2.01 K/UL
NEUTROPHILS NFR BLD AUTO: 41.3 %
NITRITE URINE: NEGATIVE
NONHDLC SERPL-MCNC: 148 MG/DL
PH URINE: 6.5
PLATELET # BLD AUTO: 242 K/UL
POTASSIUM SERPL-SCNC: 4.4 MMOL/L
PROT SERPL-MCNC: 7.5 G/DL
PROTEIN URINE: NEGATIVE
PSA SERPL-MCNC: 0.85 NG/ML
RBC # BLD: 4.88 M/UL
RBC # FLD: 11.9 %
RED BLOOD CELLS URINE: 0 /HPF
SODIUM SERPL-SCNC: 140 MMOL/L
SPECIFIC GRAVITY URINE: 1.01
SQUAMOUS EPITHELIAL CELLS: 0 /HPF
T4 FREE SERPL-MCNC: 1.4 NG/DL
TRIGL SERPL-MCNC: 117 MG/DL
TSH SERPL-ACNC: 0.91 UIU/ML
URATE SERPL-MCNC: 5.3 MG/DL
UROBILINOGEN URINE: NORMAL
VIT B12 SERPL-MCNC: 1849 PG/ML
WBC # FLD AUTO: 4.87 K/UL
WHITE BLOOD CELLS URINE: 0 /HPF

## 2022-02-17 ENCOUNTER — APPOINTMENT (OUTPATIENT)
Dept: NEUROLOGY | Facility: CLINIC | Age: 52
End: 2022-02-17
Payer: COMMERCIAL

## 2022-02-17 ENCOUNTER — NON-APPOINTMENT (OUTPATIENT)
Age: 52
End: 2022-02-17

## 2022-02-17 VITALS
HEIGHT: 69 IN | SYSTOLIC BLOOD PRESSURE: 116 MMHG | WEIGHT: 152 LBS | HEART RATE: 71 BPM | TEMPERATURE: 97.8 F | BODY MASS INDEX: 22.51 KG/M2 | DIASTOLIC BLOOD PRESSURE: 77 MMHG

## 2022-02-17 DIAGNOSIS — H53.9 UNSPECIFIED VISUAL DISTURBANCE: ICD-10-CM

## 2022-02-17 PROCEDURE — 99204 OFFICE O/P NEW MOD 45 MIN: CPT

## 2022-02-17 NOTE — REASON FOR VISIT
[Consultation] : a consultation visit [FreeTextEntry1] : Evaluation for Transient episodes of zig zag lines in front of both eyes lasted for 20-30 minutes/ seen by Opthalmology/ Optometrist recently

## 2022-02-17 NOTE — HISTORY OF PRESENT ILLNESS
[FreeTextEntry1] : He is 51-year-old patient with BPH, BPV in the past coming here for evaluation for intermittent episodes of 3 separate incidents of visual disturbance involving Zig Zag lines involving both eyes lasting for 20-30 minutes without any associated headaches, focal symptoms and a separate incidents in the past 3 years.\par Seen by optometrist/ophthalmologist recently with a negative evaluation seen by you recently and recommended neurology evaluation.\par Patient denies of any major medical problems not on a daily medications he no prior headaches create admits that he is under stress with this work as a . Not on any medications .\par No similar episodes prior to one year ago. No associated visual loss or focal neurological symptoms or headaches. Can happen during night or daytime.\par No history of trauma or injuries.

## 2022-02-17 NOTE — DISCUSSION/SUMMARY
[FreeTextEntry1] : He is 51-year-old patient with no major medical problems referred for evaluation for 3 transient episodes of zigzag lines affecting both eyes resolved completely with a nonfocal examination.\par The episodes in one year resolved currently most likely ocular migraine can't be excluded.\par No prior similar episodes in the past seen by ophthalmology with a normal examination per patient.\par No reports available.\par Recommend patient to keep a log of the events.\par Given advice regarding events and any associated symptoms with visual disturbance.\par Given foods can trigger.\par Followup evaluation in 6 months or any recurrence of symptoms.\par No workup at this time unless any more frequent episodes.\par Patient education provided.\par Followup with you for other medical problems.\par

## 2022-04-29 ENCOUNTER — NON-APPOINTMENT (OUTPATIENT)
Age: 52
End: 2022-04-29

## 2022-04-29 ENCOUNTER — APPOINTMENT (OUTPATIENT)
Dept: CARDIOLOGY | Facility: CLINIC | Age: 52
End: 2022-04-29
Payer: COMMERCIAL

## 2022-04-29 VITALS
OXYGEN SATURATION: 97 % | DIASTOLIC BLOOD PRESSURE: 60 MMHG | HEART RATE: 64 BPM | WEIGHT: 152 LBS | SYSTOLIC BLOOD PRESSURE: 92 MMHG | BODY MASS INDEX: 22.51 KG/M2 | HEIGHT: 69 IN

## 2022-04-29 DIAGNOSIS — R00.2 PALPITATIONS: ICD-10-CM

## 2022-04-29 DIAGNOSIS — R94.31 ABNORMAL ELECTROCARDIOGRAM [ECG] [EKG]: ICD-10-CM

## 2022-04-29 PROCEDURE — 99204 OFFICE O/P NEW MOD 45 MIN: CPT

## 2022-04-29 PROCEDURE — 93000 ELECTROCARDIOGRAM COMPLETE: CPT | Mod: 59

## 2022-04-29 PROCEDURE — 93242 EXT ECG>48HR<7D RECORDING: CPT

## 2022-04-29 NOTE — DISCUSSION/SUMMARY
[FreeTextEntry1] : Pt is a 52 y/o M with palpitations and abnormal ECG\par Given pt's symptoms will check perea monitor to assess for ectopy.  Advised adequate hydration.  Drug use, OTC medication use, caffeine consumption reviewed\par Will check transthoracic echocardiogram to evaluate left ventricular function and assess for any structural abnormalities\par check nuc stress test to eval for ischemia\par check CUS\par VSS\par The described plan was discussed with the pt.  All questions and concerns were addressed to the best of my knowledge.

## 2022-04-29 NOTE — HISTORY OF PRESENT ILLNESS
[FreeTextEntry1] : Pt is a 52 y/o M who is referred here today by their PCP for evaluation.  He tells me that he has been feeling irregular heart beat which lasts 1 min - no associated symptoms.  He denies CP, SOB, diaphoresis, dizziness, syncope, LE edema, PND, orthopnea. \par COVID vaccine 04/2021\par \par PMH: none \par Family hx: father CVA 60\par Smoking status: never\par no ETOH\par no drug use\par Current exercise: walking 3x/week\par Daily water intake: "a lot"\par Daily caffeine intake: 1 cup coffee\par OTC medications: none\par Previous cardiac testing: none\par Previous hospitalizations: none\par

## 2022-05-12 ENCOUNTER — TRANSCRIPTION ENCOUNTER (OUTPATIENT)
Age: 52
End: 2022-05-12

## 2022-06-30 ENCOUNTER — APPOINTMENT (OUTPATIENT)
Dept: CARDIOLOGY | Facility: CLINIC | Age: 52
End: 2022-06-30

## 2022-06-30 PROCEDURE — 78452 HT MUSCLE IMAGE SPECT MULT: CPT

## 2022-06-30 PROCEDURE — 93017 CV STRESS TEST TRACING ONLY: CPT

## 2022-06-30 PROCEDURE — A9500: CPT

## 2022-06-30 PROCEDURE — 93018 CV STRESS TEST I&R ONLY: CPT

## 2022-06-30 PROCEDURE — 93016 CV STRESS TEST SUPVJ ONLY: CPT

## 2022-11-14 ENCOUNTER — APPOINTMENT (OUTPATIENT)
Dept: OTOLARYNGOLOGY | Facility: CLINIC | Age: 52
End: 2022-11-14

## 2022-11-14 VITALS — TEMPERATURE: 96.3 F | BODY MASS INDEX: 22.51 KG/M2 | WEIGHT: 152 LBS | HEIGHT: 69 IN

## 2022-11-14 DIAGNOSIS — H93.13 TINNITUS, BILATERAL: ICD-10-CM

## 2022-11-14 DIAGNOSIS — J30.0 VASOMOTOR RHINITIS: ICD-10-CM

## 2022-11-14 PROCEDURE — 99203 OFFICE O/P NEW LOW 30 MIN: CPT | Mod: 25

## 2022-11-14 PROCEDURE — 31231 NASAL ENDOSCOPY DX: CPT

## 2022-11-14 NOTE — HISTORY OF PRESENT ILLNESS
[de-identified] : ISAAK DOTY has a history of April 2021 right ear fullness with autophony for 30 minutes.  Crackling noise in the right ear with swallowing intermittently.  Also reports tinnitus in the right ear.  Popping of the ear reported occasionally. \par Reports a history of intermittent nasal airway obstruction seasonal allergy [FreeTextEntry1] : \par

## 2022-11-14 NOTE — ASSESSMENT
[FreeTextEntry1] : Symptoms of intermittent patulous eustachian tube and obstructed eustachian tube.  Likely related to chronic rhinitis.  Continued allergy treatment including steroid nasal spray suggested.\par \par No further intervention recommended.  Follow-up as needed.

## 2022-11-14 NOTE — PROCEDURE
[FreeTextEntry6] : PROCEDURE:  NASAL ENDOSCOPY  \par \par Surgeon: Dr. Corbett\par Indication: Chronic Rhinitis\par Anesthetic: Topical lidocaine and Afrin\par Procedure: The patient was placed in a sitting position.  Following application of the topical anesthetic and decongestant, exam was performed with a flexible endoscope.  The following anatomic sites were directly examined bilaterally in a sequential fashion:\par The scope was introduced in the nasal passage between the middle and inferior turbinates to exam the inferior portion of the middle meatus and the fontanelle, as well as the maxillary ostia. Next, the scope was passed medially and posteriorly to the middle turbinates to examine the sphenoethmoid recess and the superior turbinate region.\par \par Nasopharynx: no masses, choanae patent, no adenoid tissue\par \par The following findings were noted:\par Nonobstructed eustachian tube orifice, bilaterally.

## 2022-11-14 NOTE — PHYSICAL EXAM
[FreeTextEntry1] : Procedure: Microscopic Ear Exam\par \par Left ear:  Ear canal intact without inflammation or lesion.  \par Tympanic membrane intact without inflammation.\par \par Right ear:  Ear canal intact without inflammation or lesion.  \par Tympanic membrane intact without inflammation.\par \par No respiratory movement of the tympanic membrane bilaterally [Midline] : trachea located in midline position [Normal] : no rashes

## 2022-11-14 NOTE — DATA REVIEWED
[de-identified] : Previous audiometry provided.  October 2022 hearing within normal limits bilaterally.  Tympanometry is deep in the left ear and normal in the right

## 2022-11-15 ENCOUNTER — OFFICE (OUTPATIENT)
Dept: URBAN - METROPOLITAN AREA CLINIC 12 | Facility: CLINIC | Age: 52
Setting detail: OPHTHALMOLOGY
End: 2022-11-15
Payer: COMMERCIAL

## 2022-11-15 DIAGNOSIS — H02.834: ICD-10-CM

## 2022-11-15 DIAGNOSIS — H16.223: ICD-10-CM

## 2022-11-15 DIAGNOSIS — G43.109: ICD-10-CM

## 2022-11-15 DIAGNOSIS — H02.831: ICD-10-CM

## 2022-11-15 DIAGNOSIS — H10.45: ICD-10-CM

## 2022-11-15 PROBLEM — H52.7 REFRACTIVE ERROR: Status: ACTIVE | Noted: 2022-11-15

## 2022-11-15 PROCEDURE — 92014 COMPRE OPH EXAM EST PT 1/>: CPT | Performed by: OPTOMETRIST

## 2022-11-15 ASSESSMENT — REFRACTION_CURRENTRX
OS_AXIS: 068
OS_OVR_VA: 20/
OD_AXIS: 093
OD_CYLINDER: -1.25
OD_ADD: +2.50
OD_VPRISM_DIRECTION: PROGS
OS_VPRISM_DIRECTION: PROGS
OD_OVR_VA: 20/
OD_SPHERE: -6.00
OS_CYLINDER: -0.75
OS_ADD: +2.50
OS_SPHERE: -6.50

## 2022-11-15 ASSESSMENT — SUPERFICIAL PUNCTATE KERATITIS (SPK)
OD_SPK: T
OS_SPK: T

## 2022-11-15 ASSESSMENT — REFRACTION_AUTOREFRACTION
OS_SPHERE: -6.00
OD_SPHERE: -5.75
OS_CYLINDER: -0.75
OS_AXIS: 073
OD_CYLINDER: -0.75
OD_AXIS: 110

## 2022-11-15 ASSESSMENT — AXIALLENGTH_DERIVED
OD_AL: 26.4184
OS_AL: 26.3107

## 2022-11-15 ASSESSMENT — SPHEQUIV_DERIVED
OD_SPHEQUIV: -6.125
OS_SPHEQUIV: -6.375

## 2022-11-15 ASSESSMENT — CONFRONTATIONAL VISUAL FIELD TEST (CVF)
OS_FINDINGS: FULL
OD_FINDINGS: FULL

## 2022-11-15 ASSESSMENT — KERATOMETRY
OD_K2POWER_DIOPTERS: 43.75
OD_K1POWER_DIOPTERS: 42.50
OS_K1POWER_DIOPTERS: 43.50
OS_K2POWER_DIOPTERS: 43.75
OD_AXISANGLE_DEGREES: 053
OS_AXISANGLE_DEGREES: 126

## 2022-11-15 ASSESSMENT — LID POSITION - DERMATOCHALASIS
OS_DERMATOCHALASIS: LUL 1+
OD_DERMATOCHALASIS: RUL 1+

## 2022-11-15 ASSESSMENT — VISUAL ACUITY
OD_BCVA: 20/20
OS_BCVA: 20/20

## 2022-11-15 ASSESSMENT — TONOMETRY
OD_IOP_MMHG: 13
OS_IOP_MMHG: 13

## 2022-11-18 ENCOUNTER — Encounter (OUTPATIENT)
Dept: URBAN - METROPOLITAN AREA CLINIC 12 | Facility: CLINIC | Age: 52
Setting detail: OPHTHALMOLOGY
End: 2022-11-18
Payer: COMMERCIAL

## 2022-12-23 ENCOUNTER — APPOINTMENT (OUTPATIENT)
Dept: NEUROLOGY | Facility: CLINIC | Age: 52
End: 2022-12-23

## 2023-02-15 ENCOUNTER — APPOINTMENT (OUTPATIENT)
Dept: FAMILY MEDICINE | Facility: CLINIC | Age: 53
End: 2023-02-15
Payer: COMMERCIAL

## 2023-02-15 VITALS
HEART RATE: 85 BPM | SYSTOLIC BLOOD PRESSURE: 100 MMHG | DIASTOLIC BLOOD PRESSURE: 70 MMHG | OXYGEN SATURATION: 98 % | RESPIRATION RATE: 16 BRPM | BODY MASS INDEX: 23.11 KG/M2 | HEIGHT: 69 IN | WEIGHT: 156 LBS | TEMPERATURE: 97.8 F

## 2023-02-15 DIAGNOSIS — Z13.31 ENCOUNTER FOR SCREENING FOR DEPRESSION: ICD-10-CM

## 2023-02-15 DIAGNOSIS — Z00.00 ENCOUNTER FOR GENERAL ADULT MEDICAL EXAMINATION W/OUT ABNORMAL FINDINGS: ICD-10-CM

## 2023-02-15 DIAGNOSIS — F32.A ANXIETY DISORDER, UNSPECIFIED: ICD-10-CM

## 2023-02-15 DIAGNOSIS — F41.9 ANXIETY DISORDER, UNSPECIFIED: ICD-10-CM

## 2023-02-15 PROCEDURE — 96127 BRIEF EMOTIONAL/BEHAV ASSMT: CPT

## 2023-02-15 PROCEDURE — 99396 PREV VISIT EST AGE 40-64: CPT | Mod: 25

## 2023-02-15 NOTE — HEALTH RISK ASSESSMENT
[No] : In the past 12 months have you used drugs other than those required for medical reasons? No [Patient reported colonoscopy was normal] : Patient reported colonoscopy was normal [Never] : Never [0] : 2) Feeling down, depressed, or hopeless: Not at all (0) [PHQ-2 Negative - No further assessment needed] : PHQ-2 Negative - No further assessment needed [de-identified] : Walking [de-identified] : Diet is good [SNR2Auitp] : 0 [ColonoscopyDate] : 08/2022

## 2023-02-15 NOTE — ASSESSMENT
[FreeTextEntry1] : \par check blood work, blood drawn in office\par follow up with optometry/ophthalmology and dentist for routine exams when due\par up to date with colonoscopy \par c/w diet and exercise \par recommend Shingles vaccination

## 2023-02-15 NOTE — HISTORY OF PRESENT ILLNESS
[FreeTextEntry1] : Annual physical  [de-identified] : \par 52 year old male presents for an annual physical \par \par He has onychomycosis of toenail- would like to f/u with dermatology\par \par He otherwise feels well

## 2023-02-16 ENCOUNTER — APPOINTMENT (OUTPATIENT)
Dept: DERMATOLOGY | Facility: CLINIC | Age: 53
End: 2023-02-16
Payer: COMMERCIAL

## 2023-02-16 DIAGNOSIS — B35.1 TINEA UNGUIUM: ICD-10-CM

## 2023-02-16 PROCEDURE — 99203 OFFICE O/P NEW LOW 30 MIN: CPT

## 2023-02-16 NOTE — ASSESSMENT
[Review of test: [ enter lab tests ] :____] : I reviewed the following test results: [unfilled] [FreeTextEntry1] : Presumed onychomycosis

## 2023-02-16 NOTE — HISTORY OF PRESENT ILLNESS
[FreeTextEntry1] : Toenail fungus [de-identified] : Follow-up visit for 53-year-old [place]  male with a long history of "toenail fungus".  Had previously been treated with a percent ciclopirox lacquer without improvement.  Previously treated with p.o. terbinafine with significant improvement but now the problem has recurred.\par \par Comprehensive metabolic panel done on 2/15/2023–within normal limits

## 2023-02-16 NOTE — PHYSICAL EXAM
[Alert] : alert [Oriented x 3] : ~L oriented x 3 [Well Nourished] : well nourished [FreeTextEntry3] : Patient wearing a facemask\par \par Left great toenail: Splotchy white distal onycholysis extending to base medially with mild focal subungual debris\par Similar changes present in the left fifth toenail which extends almost to the base\par Distal onycholysis extending to base on right second, third, and fifth toenails with mild subungual debris present on the right second toenail\par Note: Right great toenail is unaffected\par \par Soles: Mild scaling present at the volar base of the toes

## 2023-02-17 ENCOUNTER — APPOINTMENT (OUTPATIENT)
Dept: FAMILY MEDICINE | Facility: CLINIC | Age: 53
End: 2023-02-17
Payer: COMMERCIAL

## 2023-02-17 PROCEDURE — 90471 IMMUNIZATION ADMIN: CPT

## 2023-02-17 PROCEDURE — 90750 HZV VACC RECOMBINANT IM: CPT

## 2023-03-01 ENCOUNTER — APPOINTMENT (OUTPATIENT)
Dept: FAMILY MEDICINE | Facility: CLINIC | Age: 53
End: 2023-03-01
Payer: COMMERCIAL

## 2023-03-01 VITALS
TEMPERATURE: 97.4 F | WEIGHT: 162 LBS | SYSTOLIC BLOOD PRESSURE: 126 MMHG | OXYGEN SATURATION: 98 % | HEIGHT: 69 IN | DIASTOLIC BLOOD PRESSURE: 78 MMHG | HEART RATE: 84 BPM | BODY MASS INDEX: 23.99 KG/M2

## 2023-03-01 LAB
ALBUMIN SERPL ELPH-MCNC: 4.4 G/DL
ALP BLD-CCNC: 95 U/L
ALT SERPL-CCNC: 21 U/L
ANION GAP SERPL CALC-SCNC: 12 MMOL/L
APPEARANCE: CLEAR
AST SERPL-CCNC: 17 U/L
BACTERIA: NEGATIVE
BASOPHILS # BLD AUTO: 0.03 K/UL
BASOPHILS NFR BLD AUTO: 0.7 %
BILIRUB SERPL-MCNC: 0.4 MG/DL
BILIRUBIN URINE: NEGATIVE
BLOOD URINE: NEGATIVE
BUN SERPL-MCNC: 16 MG/DL
CALCIUM SERPL-MCNC: 9.5 MG/DL
CHLORIDE SERPL-SCNC: 104 MMOL/L
CHOLEST SERPL-MCNC: 223 MG/DL
CO2 SERPL-SCNC: 25 MMOL/L
COLOR: NORMAL
CREAT SERPL-MCNC: 0.97 MG/DL
EGFR: 95 ML/MIN/1.73M2
EOSINOPHIL # BLD AUTO: 0.19 K/UL
EOSINOPHIL NFR BLD AUTO: 4.4 %
ESTIMATED AVERAGE GLUCOSE: 111 MG/DL
GLUCOSE QUALITATIVE U: NEGATIVE
GLUCOSE SERPL-MCNC: 94 MG/DL
HBA1C MFR BLD HPLC: 5.5 %
HCT VFR BLD CALC: 45.3 %
HDLC SERPL-MCNC: 45 MG/DL
HGB BLD-MCNC: 14.6 G/DL
HYALINE CASTS: 0 /LPF
IMM GRANULOCYTES NFR BLD AUTO: 0.2 %
KETONES URINE: NEGATIVE
LDLC SERPL CALC-MCNC: 155 MG/DL
LEUKOCYTE ESTERASE URINE: NEGATIVE
LYMPHOCYTES # BLD AUTO: 2.24 K/UL
LYMPHOCYTES NFR BLD AUTO: 51.4 %
MAN DIFF?: NORMAL
MCHC RBC-ENTMCNC: 29.6 PG
MCHC RBC-ENTMCNC: 32.2 GM/DL
MCV RBC AUTO: 91.7 FL
MICROSCOPIC-UA: NORMAL
MONOCYTES # BLD AUTO: 0.29 K/UL
MONOCYTES NFR BLD AUTO: 6.7 %
NEUTROPHILS # BLD AUTO: 1.6 K/UL
NEUTROPHILS NFR BLD AUTO: 36.6 %
NITRITE URINE: NEGATIVE
NONHDLC SERPL-MCNC: 178 MG/DL
PH URINE: 6.5
PLATELET # BLD AUTO: 253 K/UL
POTASSIUM SERPL-SCNC: 5 MMOL/L
PROT SERPL-MCNC: 7.2 G/DL
PROTEIN URINE: ABNORMAL
PSA SERPL-MCNC: 1.52 NG/ML
RBC # BLD: 4.94 M/UL
RBC # FLD: 12.1 %
RED BLOOD CELLS URINE: 1 /HPF
SODIUM SERPL-SCNC: 141 MMOL/L
SPECIFIC GRAVITY URINE: 1.02
SQUAMOUS EPITHELIAL CELLS: 0 /HPF
T4 FREE SERPL-MCNC: 1.4 NG/DL
TRIGL SERPL-MCNC: 116 MG/DL
TSH SERPL-ACNC: 0.95 UIU/ML
URATE SERPL-MCNC: 4.9 MG/DL
UROBILINOGEN URINE: NORMAL
WBC # FLD AUTO: 4.36 K/UL
WHITE BLOOD CELLS URINE: 1 /HPF

## 2023-03-01 PROCEDURE — 99213 OFFICE O/P EST LOW 20 MIN: CPT

## 2023-03-01 NOTE — PHYSICAL EXAM
[No Acute Distress] : no acute distress [Well Nourished] : well nourished [Well Developed] : well developed [Well-Appearing] : well-appearing [Normal Appearance] : was normal in appearance [Neck Supple] : was supple [No Respiratory Distress] : no respiratory distress  [No Accessory Muscle Use] : no accessory muscle use [Alert and Oriented x3] : oriented to person, place, and time [Anxious] : anxious

## 2023-03-01 NOTE — HISTORY OF PRESENT ILLNESS
[FreeTextEntry1] : Follow up [de-identified] : \par 52 year old male presents for a follow up.\par \par He has been experiencing some anxiety/panic attacks recently\par he is interested in medication to help but does not want to be on a long term medication at this time\par he is open to speaking to a therapist \par he does not feel sad or down\par \par He c/o tinnitus to his right ear\par was seen by ENT in November 2022\par \par He was seen by dermatology- started on Terbinafine \par \par He had an annual physical recently- had blood work done\par here to review results

## 2023-03-01 NOTE — ASSESSMENT
[FreeTextEntry1] : Anxiety\par - start Alprazolam 0.25 mg as directed when needed, advised on risks and benefits, advised to only take when needed for anxiety attacks,  reviewed\par - referred to behavioral health counselor \par \par Hyperlipidemia\par - focus on diet and exercise as tolerated\par - f/u in 6 months to recheck blood work \par \par reviewed blood work results with patient

## 2023-03-06 ENCOUNTER — APPOINTMENT (OUTPATIENT)
Dept: OTOLARYNGOLOGY | Facility: CLINIC | Age: 53
End: 2023-03-06
Payer: COMMERCIAL

## 2023-03-06 VITALS — WEIGHT: 157 LBS | BODY MASS INDEX: 23.25 KG/M2 | HEIGHT: 69 IN

## 2023-03-06 DIAGNOSIS — H69.81 OTHER SPECIFIED DISORDERS OF EUSTACHIAN TUBE, RIGHT EAR: ICD-10-CM

## 2023-03-06 PROCEDURE — 92504 EAR MICROSCOPY EXAMINATION: CPT

## 2023-03-06 PROCEDURE — 99213 OFFICE O/P EST LOW 20 MIN: CPT | Mod: 25

## 2023-03-06 NOTE — ASSESSMENT
[FreeTextEntry1] : Symptoms suggest right patulous eustachian tube.  This is unconfirmed.  He has been treated on numerous prior occasions with nasal sprays including Patulend.  I have discussed management options.  I have suggested a trial with ventilation tube placement.  We discussed anxiety management.\par I have also provided him with referral information for eustachian tube experts.\par \par Follow-up as requested.

## 2023-03-06 NOTE — HISTORY OF PRESENT ILLNESS
[de-identified] : ISAAK DOTY has a history of April 2021 right ear fullness with autophony for 30 minutes.  Crackling noise in the right ear with swallowing intermittently.  Also reports tinnitus in the right ear.  Popping of the ear reported occasionally. \par Reports a history of intermittent nasal airway obstruction seasonal allergy [FreeTextEntry1] : 03/06/2023 \par The patient reports significant anxiety associated with right ear symptoms.  He describes crackling sensations when he swallows.  He also reports autophony, intermittent

## 2023-03-06 NOTE — CONSULT LETTER
[Please see my note below.] : Please see my note below. [FreeTextEntry2] : Dear SOLO PEREZ  [FreeTextEntry1] : Thank you for allowing me to participate in the care of ISAAK DOTY .\par Please see the attached visit note.\par \par \par \par Luis Corbett\par Otology\par Medical Director of [facility]\par Department of Otolaryngology\par [facility]

## 2023-03-06 NOTE — PHYSICAL EXAM
[Normal] : temporomandibular joint is normal [FreeTextEntry1] : Procedure: Microscopic Ear Exam\par \par Left ear:  Ear canal intact without inflammation or lesion.  \par Tympanic membrane intact without inflammation.\par \par Right ear:  Ear canal intact without inflammation or lesion.  \par Tympanic membrane intact without inflammation.  Normal position.  No retraction.  No middle ear effusion.  No respiratory motion\par

## 2023-03-23 ENCOUNTER — TRANSCRIPTION ENCOUNTER (OUTPATIENT)
Age: 53
End: 2023-03-23

## 2023-03-23 ENCOUNTER — APPOINTMENT (OUTPATIENT)
Dept: FAMILY MEDICINE | Facility: CLINIC | Age: 53
End: 2023-03-23

## 2023-04-25 ENCOUNTER — NON-APPOINTMENT (OUTPATIENT)
Age: 53
End: 2023-04-25

## 2023-06-05 ENCOUNTER — APPOINTMENT (OUTPATIENT)
Dept: FAMILY MEDICINE | Facility: CLINIC | Age: 53
End: 2023-06-05

## 2023-06-12 ENCOUNTER — APPOINTMENT (OUTPATIENT)
Dept: UROLOGY | Facility: CLINIC | Age: 53
End: 2023-06-12
Payer: COMMERCIAL

## 2023-06-12 VITALS
HEART RATE: 69 BPM | DIASTOLIC BLOOD PRESSURE: 65 MMHG | HEIGHT: 69 IN | BODY MASS INDEX: 23.25 KG/M2 | WEIGHT: 157 LBS | SYSTOLIC BLOOD PRESSURE: 97 MMHG

## 2023-06-12 DIAGNOSIS — N13.8 BENIGN PROSTATIC HYPERPLASIA WITH LOWER URINARY TRACT SYMPMS: ICD-10-CM

## 2023-06-12 DIAGNOSIS — N40.1 BENIGN PROSTATIC HYPERPLASIA WITH LOWER URINARY TRACT SYMPMS: ICD-10-CM

## 2023-06-12 PROCEDURE — 99214 OFFICE O/P EST MOD 30 MIN: CPT

## 2023-06-12 NOTE — PHYSICAL EXAM
[General Appearance - Well Developed] : well developed [Normal Appearance] : normal appearance [General Appearance - Well Nourished] : well nourished [Well Groomed] : well groomed [General Appearance - In No Acute Distress] : no acute distress [Abdomen Soft] : soft [Abdomen Tenderness] : non-tender [Costovertebral Angle Tenderness] : no ~M costovertebral angle tenderness [Urethral Meatus] : meatus normal [Penis Abnormality] : normal circumcised penis [Urinary Bladder Findings] : the bladder was normal on palpation [Scrotum] : the scrotum was normal [Testes Tenderness] : no tenderness of the testes [Testes Mass (___cm)] : there were no testicular masses [No Prostate Nodules] : no prostate nodules [Prostate Size ___ gm] : prostate size [unfilled] gm [Edema] : no peripheral edema [] : no respiratory distress [Respiration, Rhythm And Depth] : normal respiratory rhythm and effort [Exaggerated Use Of Accessory Muscles For Inspiration] : no accessory muscle use [Oriented To Time, Place, And Person] : oriented to person, place, and time [Affect] : the affect was normal [Not Anxious] : not anxious [Mood] : the mood was normal [Normal Station and Gait] : the gait and station were normal for the patient's age [No Focal Deficits] : no focal deficits [No Palpable Adenopathy] : no palpable adenopathy

## 2023-06-12 NOTE — HISTORY OF PRESENT ILLNESS
[FreeTextEntry1] : 51 year old man seen 07/26/2021 with complaint of frequency, hesitancy, weak stream, straining to void, and nocturia 2-3x/night. This began months ago.  \par It is mild in severity. Nothing makes the symptoms better, nothing makes sx worse. \par It is associated with nothing. Pt is not overly bothered by symptoms, but wants to make sure "its nothing dangerous."\par No hematuria, no dysuria. No incontinence. \par No fevers, no chills, no nausea, no vomiting, no flank pain.  No family history contributory to BPH with LUTS, nocturia. \par \par 06/12/2023: Patient presents for follow up. He reports LUTS continue as above. He had recent night with 6x/night nocturia. Has been down to 2x/night since.  UA neg, no RBCs or evidence of infection.  PVR 0 mL.\par \par PSA (%FREE) TREND \par 1.52   02/2023\par 0.85   02/2022\par 0.71   05/2021\par 1.04   02/2020

## 2023-06-12 NOTE — ASSESSMENT
[FreeTextEntry1] : 52 yo M with BPH with LUTS and nocturia. Again discussed alpha blocker or work up for possible HOLDER reducing surgery. He is interested in possible TURP or Urolift, but for now chooses to observe. WIll RTO in 6 months for sx check.\par \par FOr prostate cancer screening, PSA is unremarkable and SILVIA benign. Repeat in 1 year.

## 2023-06-12 NOTE — REVIEW OF SYSTEMS
[Negative] : Heme/Lymph [Wake up at night to urinate  How many times?  ___] : wakes up to urinate [unfilled] times during the night [Limb Weakness] : limb weakness [Joint Pain] : joint pain

## 2023-06-13 LAB
APPEARANCE: CLEAR
BACTERIA: NEGATIVE /HPF
BILIRUBIN URINE: NEGATIVE
BLOOD URINE: NEGATIVE
CAST: 0 /LPF
COLOR: YELLOW
EPITHELIAL CELLS: 0 /HPF
GLUCOSE QUALITATIVE U: NEGATIVE MG/DL
KETONES URINE: NEGATIVE MG/DL
LEUKOCYTE ESTERASE URINE: NEGATIVE
MICROSCOPIC-UA: NORMAL
NITRITE URINE: NEGATIVE
PH URINE: 7
PROTEIN URINE: NEGATIVE MG/DL
RED BLOOD CELLS URINE: 0 /HPF
SPECIFIC GRAVITY URINE: 1.01
UROBILINOGEN URINE: 0.2 MG/DL
WHITE BLOOD CELLS URINE: 0 /HPF

## 2023-06-14 LAB — BACTERIA UR CULT: NORMAL

## 2023-10-24 ENCOUNTER — APPOINTMENT (OUTPATIENT)
Dept: FAMILY MEDICINE | Facility: CLINIC | Age: 53
End: 2023-10-24

## 2023-12-21 ENCOUNTER — NON-APPOINTMENT (OUTPATIENT)
Age: 53
End: 2023-12-21

## 2023-12-21 ENCOUNTER — APPOINTMENT (OUTPATIENT)
Dept: FAMILY MEDICINE | Facility: CLINIC | Age: 53
End: 2023-12-21
Payer: COMMERCIAL

## 2023-12-21 VITALS
OXYGEN SATURATION: 98 % | WEIGHT: 159 LBS | BODY MASS INDEX: 23.55 KG/M2 | SYSTOLIC BLOOD PRESSURE: 116 MMHG | TEMPERATURE: 98.3 F | DIASTOLIC BLOOD PRESSURE: 74 MMHG | HEIGHT: 69 IN

## 2023-12-21 DIAGNOSIS — Z13.228 ENCOUNTER FOR SCREENING FOR OTHER SUSPECTED ENDOCRINE DISORDER: ICD-10-CM

## 2023-12-21 DIAGNOSIS — R07.89 OTHER CHEST PAIN: ICD-10-CM

## 2023-12-21 DIAGNOSIS — Z13.29 ENCOUNTER FOR SCREENING FOR OTHER SUSPECTED ENDOCRINE DISORDER: ICD-10-CM

## 2023-12-21 DIAGNOSIS — Z13.0 ENCOUNTER FOR SCREENING FOR OTHER SUSPECTED ENDOCRINE DISORDER: ICD-10-CM

## 2023-12-21 DIAGNOSIS — Z13.220 ENCOUNTER FOR SCREENING FOR LIPOID DISORDERS: ICD-10-CM

## 2023-12-21 PROCEDURE — 99214 OFFICE O/P EST MOD 30 MIN: CPT | Mod: 25

## 2023-12-21 PROCEDURE — 93000 ELECTROCARDIOGRAM COMPLETE: CPT

## 2023-12-21 NOTE — HISTORY OF PRESENT ILLNESS
[FreeTextEntry8] : 53 year old male presents with chest pain for past few weeks. Feels it in the middle of his chest and is sharp. States it comes and goes. He exercises daily and has no CP with exertion. Is concerned as he has fam hx of CAD/MI and CVA. Has not seen cardiology recently . He does admit to anxiety. No SOB, sweating, palpitations and otherwise feels well

## 2023-12-21 NOTE — PLAN
[FreeTextEntry1] : CP - EKG NSR with RSR, unchanged from prior - will f/u labs - f/u coronary CTA - f/u carotid doppler - cardio referral - advised ED if sx persist/worsen   HLD - f/u lipid panel

## 2023-12-22 LAB
ALBUMIN SERPL ELPH-MCNC: 4.6 G/DL
ALP BLD-CCNC: 97 U/L
ALT SERPL-CCNC: 29 U/L
ANION GAP SERPL CALC-SCNC: 9 MMOL/L
AST SERPL-CCNC: 23 U/L
BASOPHILS # BLD AUTO: 0.03 K/UL
BASOPHILS NFR BLD AUTO: 0.8 %
BILIRUB SERPL-MCNC: 0.4 MG/DL
BUN SERPL-MCNC: 10 MG/DL
CALCIUM SERPL-MCNC: 9.2 MG/DL
CHLORIDE SERPL-SCNC: 104 MMOL/L
CHOLEST SERPL-MCNC: 210 MG/DL
CO2 SERPL-SCNC: 28 MMOL/L
CREAT SERPL-MCNC: 0.88 MG/DL
EGFR: 103 ML/MIN/1.73M2
EOSINOPHIL # BLD AUTO: 0.14 K/UL
EOSINOPHIL NFR BLD AUTO: 3.7 %
ESTIMATED AVERAGE GLUCOSE: 111 MG/DL
GLUCOSE SERPL-MCNC: 97 MG/DL
HBA1C MFR BLD HPLC: 5.5 %
HCT VFR BLD CALC: 43.8 %
HDLC SERPL-MCNC: 40 MG/DL
HGB BLD-MCNC: 14.2 G/DL
IMM GRANULOCYTES NFR BLD AUTO: 0.3 %
LDLC SERPL CALC-MCNC: 128 MG/DL
LYMPHOCYTES # BLD AUTO: 2.11 K/UL
LYMPHOCYTES NFR BLD AUTO: 56.1 %
MAGNESIUM SERPL-MCNC: 2.3 MG/DL
MAN DIFF?: NORMAL
MCHC RBC-ENTMCNC: 29 PG
MCHC RBC-ENTMCNC: 32.4 GM/DL
MCV RBC AUTO: 89.6 FL
MONOCYTES # BLD AUTO: 0.24 K/UL
MONOCYTES NFR BLD AUTO: 6.4 %
NEUTROPHILS # BLD AUTO: 1.23 K/UL
NEUTROPHILS NFR BLD AUTO: 32.7 %
NONHDLC SERPL-MCNC: 171 MG/DL
PLATELET # BLD AUTO: 259 K/UL
POTASSIUM SERPL-SCNC: 4.6 MMOL/L
PROT SERPL-MCNC: 7.3 G/DL
RBC # BLD: 4.89 M/UL
RBC # FLD: 12.3 %
SODIUM SERPL-SCNC: 141 MMOL/L
TRIGL SERPL-MCNC: 239 MG/DL
TROPONIN I SERPL-MCNC: 0.01 NG/ML
TSH SERPL-ACNC: 1.7 UIU/ML
WBC # FLD AUTO: 3.76 K/UL

## 2023-12-23 LAB — DEPRECATED D DIMER PPP IA-ACNC: <150 NG/ML DDU

## 2024-01-02 ENCOUNTER — OFFICE (OUTPATIENT)
Dept: URBAN - METROPOLITAN AREA CLINIC 12 | Facility: CLINIC | Age: 54
Setting detail: OPHTHALMOLOGY
End: 2024-01-02
Payer: COMMERCIAL

## 2024-01-02 DIAGNOSIS — H02.014: ICD-10-CM

## 2024-01-02 DIAGNOSIS — G43.109: ICD-10-CM

## 2024-01-02 DIAGNOSIS — H16.223: ICD-10-CM

## 2024-01-02 DIAGNOSIS — H10.45: ICD-10-CM

## 2024-01-02 DIAGNOSIS — H52.4: ICD-10-CM

## 2024-01-02 PROCEDURE — 92015 DETERMINE REFRACTIVE STATE: CPT | Performed by: OPTOMETRIST

## 2024-01-02 PROCEDURE — 92014 COMPRE OPH EXAM EST PT 1/>: CPT | Performed by: OPTOMETRIST

## 2024-01-02 ASSESSMENT — LID EXAM ASSESSMENTS: OS_TRICHIASIS: LUL

## 2024-01-02 ASSESSMENT — SPHEQUIV_DERIVED
OS_SPHEQUIV: -6.875
OD_SPHEQUIV: -6.5
OS_SPHEQUIV: -6.75
OD_SPHEQUIV: -6.25

## 2024-01-02 ASSESSMENT — REFRACTION_CURRENTRX
OD_CYLINDER: -1.00
OS_VPRISM_DIRECTION: PROGS
OS_OVR_VA: 20/
OD_ADD: +2.50
OD_VPRISM_DIRECTION: PROGS
OD_SPHERE: -6.25
OD_AXIS: 098
OS_CYLINDER: -0.75
OS_SPHERE: -6.50
OS_ADD: +2.50
OD_OVR_VA: 20/
OS_AXIS: 070

## 2024-01-02 ASSESSMENT — REFRACTION_AUTOREFRACTION
OD_SPHERE: -5.75
OD_AXIS: 11
OD_CYLINDER: -1.00
OS_CYLINDER: -0.50
OS_AXIS: 055
OS_SPHERE: -6.50

## 2024-01-02 ASSESSMENT — REFRACTION_MANIFEST
OD_AXIS: 100
OS_AXIS: 060
OD_SPHERE: -6.00
OD_VA1: 20/20-
OD_ADD: +2.50
OS_ADD: +2.50
OS_CYLINDER: -0.75
OS_SPHERE: -6.50
OS_VA1: 20/25+
OD_CYLINDER: -1.00

## 2024-01-02 ASSESSMENT — LID POSITION - DERMATOCHALASIS
OS_DERMATOCHALASIS: LUL 1+
OD_DERMATOCHALASIS: RUL 1+

## 2024-01-02 ASSESSMENT — CONFRONTATIONAL VISUAL FIELD TEST (CVF)
OD_FINDINGS: FULL
OS_FINDINGS: FULL

## 2024-01-02 ASSESSMENT — SUPERFICIAL PUNCTATE KERATITIS (SPK)
OD_SPK: T
OS_SPK: T

## 2024-01-18 ENCOUNTER — APPOINTMENT (OUTPATIENT)
Dept: CARDIOLOGY | Facility: CLINIC | Age: 54
End: 2024-01-18

## 2024-02-26 ENCOUNTER — APPOINTMENT (OUTPATIENT)
Dept: UROLOGY | Facility: CLINIC | Age: 54
End: 2024-02-26
Payer: COMMERCIAL

## 2024-02-26 VITALS
WEIGHT: 164 LBS | HEIGHT: 72 IN | SYSTOLIC BLOOD PRESSURE: 117 MMHG | OXYGEN SATURATION: 97 % | BODY MASS INDEX: 22.21 KG/M2 | HEART RATE: 67 BPM | DIASTOLIC BLOOD PRESSURE: 77 MMHG

## 2024-02-26 DIAGNOSIS — R35.1 NOCTURIA: ICD-10-CM

## 2024-02-26 PROCEDURE — 99212 OFFICE O/P EST SF 10 MIN: CPT

## 2024-02-26 NOTE — HISTORY OF PRESENT ILLNESS
[FreeTextEntry1] : 51 year old man seen 07/26/2021 with complaint of frequency, hesitancy, weak stream, straining to void, and nocturia 2-3x/night. This began months ago.   It is mild in severity. Nothing makes the symptoms better, nothing makes sx worse.  It is associated with nothing. Pt is not overly bothered by symptoms, but wants to make sure "its nothing dangerous." No hematuria, no dysuria. No incontinence.  No fevers, no chills, no nausea, no vomiting, no flank pain.  No family history contributory to BPH with LUTS, nocturia.   06/12/2023: Patient presents for follow up. He reports LUTS continue as above. He had recent night with 6x/night nocturia. Has been down to 2x/night since.  UA neg, no RBCs or evidence of infection.  PVR 0 mL.  02/26/2024: Patient presents for follow up. He c/o nocturia has increased to 4x/night. Not overly bothersome. No daytime symptoms or other complaints.   PSA (%FREE) TREND  1.52   02/2023 0.85   02/2022 0.71   05/2021 1.04   02/2020

## 2024-02-26 NOTE — ASSESSMENT
[FreeTextEntry1] : 54 yo M with BPH with LUTS and nocturia. Again discussed OAB medications, alpha blocker or work up for possible HOLDER reducing surgery. He declines all at this time. RTO in 6-12 months or sooner PRN.

## 2024-02-26 NOTE — ASSESSMENT
[FreeTextEntry1] : 52 yo M with BPH with LUTS and nocturia. Again discussed OAB medications, alpha blocker or work up for possible HOLDER reducing surgery. He declines all at this time. RTO in 6-12 months or sooner PRN.

## 2024-02-26 NOTE — PHYSICAL EXAM
[General Appearance - Well Developed] : well developed [General Appearance - Well Nourished] : well nourished [Normal Appearance] : normal appearance [Well Groomed] : well groomed [General Appearance - In No Acute Distress] : no acute distress [Abdomen Tenderness] : non-tender [Abdomen Soft] : soft [Urethral Meatus] : meatus normal [Costovertebral Angle Tenderness] : no ~M costovertebral angle tenderness [Penis Abnormality] : normal circumcised penis [Urinary Bladder Findings] : the bladder was normal on palpation [Scrotum] : the scrotum was normal [Testes Tenderness] : no tenderness of the testes [Testes Mass (___cm)] : there were no testicular masses [No Prostate Nodules] : no prostate nodules [Prostate Size ___ gm] : prostate size [unfilled] gm [Edema] : no peripheral edema [Respiration, Rhythm And Depth] : normal respiratory rhythm and effort [] : no respiratory distress [Exaggerated Use Of Accessory Muscles For Inspiration] : no accessory muscle use [Oriented To Time, Place, And Person] : oriented to person, place, and time [Affect] : the affect was normal [Mood] : the mood was normal [Not Anxious] : not anxious [Normal Station and Gait] : the gait and station were normal for the patient's age [No Palpable Adenopathy] : no palpable adenopathy [No Focal Deficits] : no focal deficits

## 2024-02-26 NOTE — PHYSICAL EXAM
[General Appearance - Well Developed] : well developed [Normal Appearance] : normal appearance [General Appearance - Well Nourished] : well nourished [Well Groomed] : well groomed [General Appearance - In No Acute Distress] : no acute distress [Abdomen Tenderness] : non-tender [Abdomen Soft] : soft [Costovertebral Angle Tenderness] : no ~M costovertebral angle tenderness [Urethral Meatus] : meatus normal [Penis Abnormality] : normal circumcised penis [Urinary Bladder Findings] : the bladder was normal on palpation [Scrotum] : the scrotum was normal [Testes Tenderness] : no tenderness of the testes [Testes Mass (___cm)] : there were no testicular masses [No Prostate Nodules] : no prostate nodules [Prostate Size ___ gm] : prostate size [unfilled] gm [Edema] : no peripheral edema [Respiration, Rhythm And Depth] : normal respiratory rhythm and effort [] : no respiratory distress [Exaggerated Use Of Accessory Muscles For Inspiration] : no accessory muscle use [Affect] : the affect was normal [Oriented To Time, Place, And Person] : oriented to person, place, and time [Not Anxious] : not anxious [Mood] : the mood was normal [Normal Station and Gait] : the gait and station were normal for the patient's age [No Palpable Adenopathy] : no palpable adenopathy [No Focal Deficits] : no focal deficits

## 2024-02-27 LAB
APPEARANCE: CLEAR
BACTERIA: NEGATIVE /HPF
BILIRUBIN URINE: NEGATIVE
BLOOD URINE: NEGATIVE
CAST: 0 /LPF
COLOR: YELLOW
EPITHELIAL CELLS: 0 /HPF
GLUCOSE QUALITATIVE U: NEGATIVE MG/DL
KETONES URINE: NEGATIVE MG/DL
LEUKOCYTE ESTERASE URINE: NEGATIVE
MICROSCOPIC-UA: NORMAL
NITRITE URINE: NEGATIVE
PH URINE: 6.5
PROTEIN URINE: NEGATIVE MG/DL
RED BLOOD CELLS URINE: 0 /HPF
SPECIFIC GRAVITY URINE: 1.01
UROBILINOGEN URINE: 0.2 MG/DL
WHITE BLOOD CELLS URINE: 0 /HPF

## 2024-02-28 LAB — BACTERIA UR CULT: NORMAL

## 2024-05-21 ENCOUNTER — APPOINTMENT (OUTPATIENT)
Dept: FAMILY MEDICINE | Facility: CLINIC | Age: 54
End: 2024-05-21
Payer: COMMERCIAL

## 2024-05-21 VITALS
HEART RATE: 77 BPM | BODY MASS INDEX: 21.94 KG/M2 | DIASTOLIC BLOOD PRESSURE: 74 MMHG | TEMPERATURE: 98.1 F | OXYGEN SATURATION: 98 % | HEIGHT: 72 IN | SYSTOLIC BLOOD PRESSURE: 122 MMHG | WEIGHT: 162 LBS

## 2024-05-21 DIAGNOSIS — J30.9 ALLERGIC RHINITIS, UNSPECIFIED: ICD-10-CM

## 2024-05-21 PROCEDURE — 96127 BRIEF EMOTIONAL/BEHAV ASSMT: CPT

## 2024-05-21 PROCEDURE — 99214 OFFICE O/P EST MOD 30 MIN: CPT

## 2024-05-21 RX ORDER — IPRATROPIUM BROMIDE 21 UG/1
0.03 SPRAY NASAL
Qty: 1 | Refills: 0 | Status: ACTIVE | COMMUNITY
Start: 2024-05-21 | End: 1900-01-01

## 2024-05-21 RX ORDER — ALPRAZOLAM 0.25 MG/1
0.25 TABLET ORAL
Qty: 30 | Refills: 0 | Status: ACTIVE | COMMUNITY
Start: 2023-03-01 | End: 1900-01-01

## 2024-05-21 NOTE — PHYSICAL EXAM
[No Acute Distress] : no acute distress [Well Nourished] : well nourished [Well Developed] : well developed [Well-Appearing] : well-appearing [Normal TMs] : both tympanic membranes were normal [Normal Appearance] : was normal in appearance [Neck Supple] : was supple [No Respiratory Distress] : no respiratory distress  [Clear to Auscultation] : lungs were clear to auscultation bilaterally [Normal Rate] : normal rate  [Regular Rhythm] : with a regular rhythm [Normal S1, S2] : normal S1 and S2 [No Murmur] : no murmur heard [Alert and Oriented x3] : oriented to person, place, and time [Anxious] : anxious

## 2024-05-21 NOTE — REVIEW OF SYSTEMS
[Fatigue] : fatigue [Insomnia] : insomnia [Anxiety] : anxiety [Depression] : depression [Negative] : Respiratory [Fever] : no fever [Chills] : no chills

## 2024-05-21 NOTE — HEALTH RISK ASSESSMENT
[Never] : Never [2] : 1) Little interest or pleasure doing things for more than half of the days (2) [3] : 2) Feeling down, depressed, or hopeless for nearly every day (3) [PHQ-2 Positive] : PHQ-2 Positive [PHQ-9 Positive] : PHQ-9 Positive [I have developed a follow-up plan documented below in the note.] : I have developed a follow-up plan documented below in the note. [[id number]] : 5

## 2024-05-21 NOTE — ASSESSMENT
[FreeTextEntry1] : Anxiety/Depression - start Sertraline 25 mg daily - take Alprazolam 0.25 mg as directed when needed, advised on risks and benefits, advised to only take when needed for anxiety attacks,  reviewed - monitor symptoms  - referred to behavioral health counselor- would benefit from connecting to a therapist, possibly psychiatrist   Allergies - use Ipratropium bromide nasal spray as directed   Hyperlipidemia - focus on diet and exercise as tolerated - check blood work   HCM - check updated blood work

## 2024-05-21 NOTE — HISTORY OF PRESENT ILLNESS
[FreeTextEntry1] : Follow up [de-identified] : 53 year old male presents for a follow up.  He has been experiencing worsening anxiety/panic attacks/depression recently  he has not been sleeping well and feeling tired has lost desire to work denies SI  interested in going back on medication- had been prescribed Sertraline previously with improvement   He continues to experience tinnitus, right ear>left ear  has seen ENT previously   also with allergies  requesting non-steroidal nasal spray   has h/o Hyperlipidemia here to check updated blood work  has BPH- follows with urology  not on medication   up to date with colonoscopy

## 2024-05-29 ENCOUNTER — APPOINTMENT (OUTPATIENT)
Dept: FAMILY MEDICINE | Facility: CLINIC | Age: 54
End: 2024-05-29

## 2024-06-03 LAB
25(OH)D3 SERPL-MCNC: 73.6 NG/ML
ALBUMIN SERPL ELPH-MCNC: 4.9 G/DL
ALP BLD-CCNC: 97 U/L
ALT SERPL-CCNC: 19 U/L
ANION GAP SERPL CALC-SCNC: 13 MMOL/L
APPEARANCE: CLEAR
AST SERPL-CCNC: 22 U/L
BACTERIA: NEGATIVE /HPF
BASOPHILS # BLD AUTO: 0.05 K/UL
BASOPHILS NFR BLD AUTO: 1 %
BILIRUB SERPL-MCNC: 0.6 MG/DL
BILIRUBIN URINE: NEGATIVE
BLOOD URINE: NEGATIVE
BUN SERPL-MCNC: 8 MG/DL
CALCIUM SERPL-MCNC: 9.4 MG/DL
CAST: 0 /LPF
CHLORIDE SERPL-SCNC: 98 MMOL/L
CHOLEST SERPL-MCNC: 229 MG/DL
CO2 SERPL-SCNC: 23 MMOL/L
COLOR: YELLOW
CREAT SERPL-MCNC: 0.84 MG/DL
EGFR: 105 ML/MIN/1.73M2
EOSINOPHIL # BLD AUTO: 0.13 K/UL
EOSINOPHIL NFR BLD AUTO: 2.5 %
EPITHELIAL CELLS: 0 /HPF
ESTIMATED AVERAGE GLUCOSE: 105 MG/DL
GLUCOSE QUALITATIVE U: NEGATIVE MG/DL
GLUCOSE SERPL-MCNC: 111 MG/DL
HBA1C MFR BLD HPLC: 5.3 %
HCT VFR BLD CALC: 44.2 %
HDLC SERPL-MCNC: 55 MG/DL
HGB BLD-MCNC: 15.2 G/DL
IMM GRANULOCYTES NFR BLD AUTO: 0.2 %
KETONES URINE: NEGATIVE MG/DL
LDLC SERPL CALC-MCNC: 156 MG/DL
LEUKOCYTE ESTERASE URINE: NEGATIVE
LYMPHOCYTES # BLD AUTO: 1.58 K/UL
LYMPHOCYTES NFR BLD AUTO: 30.9 %
MAN DIFF?: NORMAL
MCHC RBC-ENTMCNC: 30.2 PG
MCHC RBC-ENTMCNC: 34.4 GM/DL
MCV RBC AUTO: 87.7 FL
MICROSCOPIC-UA: NORMAL
MONOCYTES # BLD AUTO: 0.31 K/UL
MONOCYTES NFR BLD AUTO: 6.1 %
NEUTROPHILS # BLD AUTO: 3.03 K/UL
NEUTROPHILS NFR BLD AUTO: 59.3 %
NITRITE URINE: NEGATIVE
NONHDLC SERPL-MCNC: 174 MG/DL
PH URINE: 7.5
PLATELET # BLD AUTO: 288 K/UL
POTASSIUM SERPL-SCNC: 4.8 MMOL/L
PROT SERPL-MCNC: 7.7 G/DL
PROTEIN URINE: NEGATIVE MG/DL
PSA SERPL-MCNC: 0.82 NG/ML
RBC # BLD: 5.04 M/UL
RBC # FLD: 12.7 %
RED BLOOD CELLS URINE: 0 /HPF
SODIUM SERPL-SCNC: 134 MMOL/L
SPECIFIC GRAVITY URINE: 1.01
TRIGL SERPL-MCNC: 100 MG/DL
TSH SERPL-ACNC: 1.56 UIU/ML
URATE SERPL-MCNC: 4.1 MG/DL
UROBILINOGEN URINE: 0.2 MG/DL
WBC # FLD AUTO: 5.11 K/UL
WHITE BLOOD CELLS URINE: 0 /HPF

## 2024-06-03 RX ORDER — SERTRALINE HYDROCHLORIDE 50 MG/1
50 TABLET, FILM COATED ORAL DAILY
Qty: 30 | Refills: 1 | Status: ACTIVE | COMMUNITY
Start: 2024-06-03 | End: 1900-01-01

## 2024-06-07 NOTE — DISCUSSION/SUMMARY
[FreeTextEntry1] : Mental Health History Today's Date  05- Patient Date of Birth 1970 Time Spent  75 minutes  History of past mental health treatment Have you ever been in mental health treatment in the past?   No  Psychotropic Medication History Have you ever been prescribed a psychotropic medication (i.e. SSRI, benzo, mood stabilizer, anti-psychotic, etc.)?  Yes Comments Patient is currently prescribed Sertraline 50 mg (1 tablet daily) and Xanax .25mg (PRN). Patient states he is not taking the Xanax currently.   Past Suicidality Previous attempt (ever in lifetime) None Known  C-SSR Screener (lifetime/recent) Passive Ideation Have you wished you were dead or wished you could go to sleep and not wake up?  No  Active Ideation Have you actually had any thoughts of killing yourself?  No  Suicidal Behavior Have you ever done anything, started to do anything, or prepared to do anything to end your life?  No  Additional details of suicidal ideation or suicidal behavior not mentioned in HPI. (include collateral information) Firearm Safety Do you have access to lethal means? No  History of Violence Have you ever engaged in physically violent behavior towards others or destruction of property?  No  Family History Is there a history of mental health concerns in your family?  No  Substance Abuse LISANDRO History Have you ever had a problem with alcohol or drugs?    No  Treatment History Did you ever participate in any type LISANDRO treatment? (Inpatient Rehab or Detox, Outpatient Treatment, Medication Assisted Treatment) No  Medical History Do you suffer from any chronic medical conditions?  Yes Comments Hyperlipidemia  Do you suffer from chronic pain?  No  Social History (Adult)  Primary Language  Patient's Primary Language  English  Living Situation Living Situation Status  Spouse  Caregiver Status Do you serve as primary caregiver for any children or adults?  Children under 18 Comments Patient reports having three children that he cares for.   Gender Identity Patient's Gender Identity Male  Sexual Orientation Patient's Sexual Orientation Heterosexual  Relationship Status Patient's Relationship Status   Trauma Patient Trauma  None reported  Employment Status Are you currently employed?  Employed Comments Patient reports being employed as a  in [employer].   SDOH SDOH Concerns None  History of Present Illness Current Concerns/Chief Complaints Patient is a 55 y/o male presenting with symptoms of anxiety and depression. Patient reports recent loss of happiness, difficulty sleeping, difficulty with concentration. Patient also reports loss of appetite. Patient reports experiencing symptoms of anxiety and depression during the pandemic which prompted him to start medication. Patient reports discontinuing the use of medication shortly after and reports starting medication again. Patient reports symptoms to be impacting daily functioning as he is constantly experiencing brain fog or feels his brain freezes. Patient states he has not tried talk therapy and declined interest in talk therapy at present. Patient expressed interest in TMS treatment as he feels this will be effective with the current medication prescribed.   Current Symptom screening a. Depressive symptoms  {PHQ-9  }- Writer administered PHQ-9 screening. Patient scored a 17 indicating patient meets diagnostic criteria for depression.  b. SI/Safety- Writer assessed for safety. There are no safety concerns noted at this time.  c. Anxiety symptoms  {SUZE-7  }- Writer administered SUZE-7 screening. Patient scored a 15 indicating patient meets diagnostic criteria for anxiety.  d. Ghazal- None reported.  e. Psychosis- None reported.  f. LISANDRO- None reported.  g. ADHD/Disruptive Behavior- None reported.   Initial Care Plan Specify Problems  Anxiety Depression   Specify Interventions  Care Coordination  Intervention Details Patient meets diagnostic criteria for anxiety and depression. Patient declined brief counseling at this time and expressed interest in connecting to a provider that can provide treatment with TMS. Writer to assist with locating providers. Patient to continue taking medication as prescribed. Patient in agreement.

## 2024-06-10 ENCOUNTER — TRANSCRIPTION ENCOUNTER (OUTPATIENT)
Age: 54
End: 2024-06-10

## 2024-06-10 ENCOUNTER — APPOINTMENT (OUTPATIENT)
Dept: FAMILY MEDICINE | Facility: CLINIC | Age: 54
End: 2024-06-10
Payer: COMMERCIAL

## 2024-06-10 ENCOUNTER — NON-APPOINTMENT (OUTPATIENT)
Age: 54
End: 2024-06-10

## 2024-06-10 VITALS
BODY MASS INDEX: 21.59 KG/M2 | HEART RATE: 92 BPM | DIASTOLIC BLOOD PRESSURE: 72 MMHG | WEIGHT: 159.4 LBS | SYSTOLIC BLOOD PRESSURE: 110 MMHG | OXYGEN SATURATION: 97 % | TEMPERATURE: 97.9 F | HEIGHT: 72 IN

## 2024-06-10 DIAGNOSIS — F32.A DEPRESSION, UNSPECIFIED: ICD-10-CM

## 2024-06-10 DIAGNOSIS — F41.9 ANXIETY DISORDER, UNSPECIFIED: ICD-10-CM

## 2024-06-10 PROCEDURE — 99213 OFFICE O/P EST LOW 20 MIN: CPT

## 2024-06-10 NOTE — HISTORY OF PRESENT ILLNESS
[FreeTextEntry1] : Follow up- Anxiety/Depression [de-identified] :  54 year old male presents for a follow up. Since starting Sertraline- he has been experiencing what he calls "visual hallucination" prior to falling sleep at night. He reports he will see a "visual image" that can be disturbing to him. This does not occur during the day. He also reports feeling "brain fog" since starting Sertraline. He denies any SI or HI.

## 2024-06-10 NOTE — ASSESSMENT
[FreeTextEntry1] : spoke to behavioral health counselor will look to connect to patient to psychiatry recommend he stop Sertraline  c/w Alprazolam PRN for now if needed- if symptoms worsen, he can go to the ER

## 2024-06-11 ENCOUNTER — TRANSCRIPTION ENCOUNTER (OUTPATIENT)
Age: 54
End: 2024-06-11

## 2024-06-12 ENCOUNTER — APPOINTMENT (OUTPATIENT)
Dept: PSYCHIATRY | Facility: TELEHEALTH | Age: 54
End: 2024-06-12
Payer: COMMERCIAL

## 2024-06-12 DIAGNOSIS — H69.93 UNSPECIFIED EUSTACHIAN TUBE DISORDER, BILATERAL: ICD-10-CM

## 2024-06-12 DIAGNOSIS — B00.1 HERPESVIRAL VESICULAR DERMATITIS: ICD-10-CM

## 2024-06-12 DIAGNOSIS — H93.A9 PULSATILE TINNITUS, UNSPECIFIED EAR: ICD-10-CM

## 2024-06-12 DIAGNOSIS — F41.1 GENERALIZED ANXIETY DISORDER: ICD-10-CM

## 2024-06-12 DIAGNOSIS — E78.5 HYPERLIPIDEMIA, UNSPECIFIED: ICD-10-CM

## 2024-06-12 DIAGNOSIS — G43.109 MIGRAINE WITH AURA, NOT INTRACTABLE, W/OUT STATUS MIGRAINOSUS: ICD-10-CM

## 2024-06-12 DIAGNOSIS — F32.9 MAJOR DEPRESSIVE DISORDER, SINGLE EPISODE, UNSPECIFIED: ICD-10-CM

## 2024-06-12 DIAGNOSIS — H81.10 BENIGN PAROXYSMAL VERTIGO, UNSPECIFIED EAR: ICD-10-CM

## 2024-06-12 PROCEDURE — 99205 OFFICE O/P NEW HI 60 MIN: CPT | Mod: 95

## 2024-06-12 RX ORDER — TERBINAFINE HYDROCHLORIDE 250 MG/1
250 TABLET ORAL
Qty: 90 | Refills: 0 | Status: DISCONTINUED | COMMUNITY
Start: 2020-02-14 | End: 2024-06-12

## 2024-06-12 RX ORDER — SERTRALINE 25 MG/1
25 TABLET, FILM COATED ORAL
Qty: 30 | Refills: 1 | Status: DISCONTINUED | COMMUNITY
Start: 2024-05-21 | End: 2024-06-12

## 2024-06-12 NOTE — REASON FOR VISIT
[Continuity of care] : to ensure continuity of care [Telehealth (audio & video) - Individual/Group] : This visit was provided via telehealth using real-time 2-way audio visual technology. [Other Location: e.g. Home (Enter Location, City,State)___] : The provider was located at [unfilled]. [Home] : The patient, [unfilled], was located at home, [unfilled], at the time of the visit. [Verbal consent obtained from patient/other participant(s)] : Verbal consent for telehealth/telephonic services obtained from patient/other participant(s) [Primary Care] : Primary Care [FreeTextEntry4] : 13:00 [FreeTextEntry5] : 14:00 [FreeTextEntry2] : Patient assessed on 5/29. Patient presenting with symptoms of anxiety and depression. Patient reports persistent symptoms which cause brain fog, patient reports wanting to be happy. Patient arrived at PCP practice today while writer was on site. Patient presenting with what he describes as possible hallucinations when he is falling asleep. Patient states hallucinations started when he began taking Sertraline 25-50mg. Patient's provider discontinued Sertraline as of today (Monday 6/10). Patient was advised to continuing taking Xanax .25mg (PRN) as he finds this to help when needed. Patient is receptive to medication and is hoping to explore an alternative. [FreeTextEntry1] : telepsych consultation via collaborative care program

## 2024-06-12 NOTE — HISTORY OF PRESENT ILLNESS
[FreeTextEntry1] : 55 yo M with history of depressive disorder, anxiety symptoms presenting for telepsych consultation at referral of his PCP Dr. Anitra Martinez and  clinician Janeen Morales LCSW for evaluation/treatment recommendations for new onset hallucinations while falling asleep since starting sertraline.  Clinical history obtained from both interview and chart review.   Was prescribed sertraline beginning 3 weeks ago, unsure why it was started. Was supposed to increased to 50 mg but he self-discontinued two days ago because he was having "horrible side effects"- headache, brain fog, insomnia, "hallucinations". Said these began shortly after starting the medication. He was also prescribed the sertraline 3 years ago at which time he recalls experiencing brain fog but no other side effects.  Has been worried that the hallucinations could indicate he is developing schizophrenia. Described the hallucinations as "unrealistic picture that I've never seen", has wondered if it was a dream. Occur only when he is just about to fall asleep at the start of the night or overnight if he had awoken. Reported the hallucination lasts for a few seconds then disappears and he is immediately aware that it was not reality based, though the experience feels very vivid and realistic when it happens. He has dealt with this by trying to ignore it and go back to sleep. Denied hypnopompic or daytime hallucinations. Denied IOR, paranoia.   Regarding symptoms that prompted him to start sertraline initially- described having "a lot of stress", being unable to concentrate at work, feeling less interested in spending time with friends, feeling like "a totally different person", does not feel happy, less talkative, +anhedonia, at times feels sad, down, appetite is intermittently suppressed, described that he used to be a creative person but has been less so recently. Feels "emotionless". Denied suicidal thoughts, homicidal thoughts.  Anxiety symptoms include "feeling uncomfortable", feels this all the time. Described nervousness, excessive worrying and difficulty controlling the worrying. Denied panic attacks. Denied somatic symptoms including racing heart, headaches, brain fog, no GI. denied SOB, denied chest pain. Perseverative about concerns about developing a psychotic disorder despite much reassurance.  [FreeTextEntry2] : -Past or Current Mental Health Providers: ---Psychopharm: PCP currently prescribing sertraline, has not been previously evaluated by a psychiatrist  ---Psychotherapy: denied -Historical Diagnoses: depressive/anxiety disorder -Prior Psychiatric Hospitalizations/ED Evaluations: denied -Past Suicide Attempts: denied -History of self-injurious behavior: denied -History of assaultive or violent behavior: denied   Substance Use History: -Nicotine: never smoker -Alcohol: formerly, 2-3 years ago, denied history problematic drinking -Cannabis: denied -Caffeine: denied -Other Illicit drugs: denied -Medical/social/legal problems related to substance use: denied -Prior Substance Use Tx: denied    [FreeTextEntry3] : Current psych meds:  --sertraline 50 mg daily (self-discontinued two days ago) --alprazolam 0.25 mg BID PRN   Past psych meds: --sertraline about 3 years ago, took for 2 months then stopped because he felt better (was prescribed for anxiety),

## 2024-06-12 NOTE — FAMILY HISTORY
[FreeTextEntry1] :  Psychiatric family history: -Suicide: denied -ADHD: denied  -Mood disorders: denied -Anxiety disorders: denied -Psychotic disorders: denied -Substance use disorders: denied

## 2024-06-12 NOTE — DISCUSSION/SUMMARY
[Low acute suicide risk] : Low acute suicide risk [No] : No [Not clinically indicated] : Safety Plan completed/updated (for individuals at risk): Not clinically indicated [FreeTextEntry1] : 55 yo M with history of depressive disorder, anxiety symptoms presenting for telepsych consultation at referral of his PCP Dr. Anitra Martinez and  clinician Janeen Morales LCSW for evaluation/treatment recommendations for new onset hallucinations while falling asleep since starting sertraline.  Hallucinations he describes are characteristic of hypnagogic hallucinations which are common and not considered pathological. He does not have any known prior history of daytime hallucinations or other symptoms of psychosis that would raise a concern for a psychotic disorder. Hypagognic hallucinations may be more likely to occur in the setting of SSRI use, however this is not objectively any cause for concern and may improve over time. Given depressive and generalized anxiety symptoms, would recommend continuing sertraline however given the degree of his anxiety about it potentially causing the hallucinations, it may be beneficial to switch to an alternative such as escitalopram to minimize anticipatory distress about treatment.

## 2024-06-12 NOTE — SOCIAL HISTORY
[FreeTextEntry1] : -[place]: Grew up in [place], moved to the  in 20's.  -Current living situation: [place] with wife and three kids  -Highest Level of Education: Graduated HS, some college  -Work History: Works as commercial real-estate  -Romantic relationships:  -Children: 3 kids in college/high school/middle school  -History of Trauma: deferred -Legal History: Patient denies recent or remote hx of legal problems. -Access to firearms: patient denies.

## 2024-06-12 NOTE — PHYSICAL EXAM
[None] : none [Average] : average [Cooperative] : cooperative [Euthymic] : euthymic [Full] : full [Clear] : clear [Linear/Goal Directed] : linear/goal directed [WNL] : within normal limits [Racing] : racing [Preoccupations/Ruminations] : preoccupations/ruminations [None Reported] : none reported [FreeTextEntry8] : "sad, down" [FreeTextEntry6] : perseverative, circular

## 2024-06-12 NOTE — PAST MEDICAL HISTORY
[FreeTextEntry1] : PCP: Anitra Martinez  Hx of TBI: denied  Hx of Seizures: denied Hx of Migraine HA: denied   Relevant Labs/EKG:

## 2024-06-12 NOTE — PLAN
[FreeTextEntry4] : 1. SUZE, 2. MDD, current, single episode --No contraindication to continuing sertraline however can switch to escitalopram if patient prefers. Start escitalopram at 5 mg daily for one week then increase to 10 mg daily. Reassess at 4-6 weeks prior to further dose increases. Max daily dose 20 mg/day. --Recommend referral to long-term psychiatry should patient require level of care that exceed that possible in primary care --Recommend referral to psychotherapy, preferably with a practitioner who can speak his native language (Chinese)   -[facility] reviewed prior to all controlled substance prescriptions. - I have given my usual talk about the risks and benefits of all treatment recommendations including alternatives and the risks and benefits of no treatment at all. Patient had the opportunity to have all questions answered to their satisfaction. They expressed understanding and agreement with the above treatment plan. - Educated patient of importance of remaining abstinent from drugs and alcohol. - Discussed that unpredictable effects including cardiorespiratory collapse can result from the combination of illicit drugs and prescribed medications. Patient verbalized understanding. - Emergency procedures were discussed: pt. educated to call 911 or go to nearest ER for worsening of symptoms/suicidal/homicidal ideation. - Patient given opportunity to ask questions and their questions were answered and they expressed understanding and agreement with above plan.   I spent a total of 60 minutes for today's visit in evaluating and treating the patient as per above, including preparing for patient's appointment (review of prior documents, [facility] ), performing a medically necessary exam/evaluation, communicating/counseling/educating the patient ordering medications, documenting clinical information in the EHR.

## 2024-07-03 ENCOUNTER — APPOINTMENT (OUTPATIENT)
Dept: ORTHOPEDIC SURGERY | Facility: CLINIC | Age: 54
End: 2024-07-03

## 2024-07-03 DIAGNOSIS — M47.812 SPONDYLOSIS W/OUT MYELOPATHY OR RADICULOPATHY, CERVICAL REGION: ICD-10-CM

## 2024-07-03 PROCEDURE — ZZZZZ: CPT

## 2024-07-03 RX ORDER — METHOCARBAMOL 750 MG/1
750 TABLET, FILM COATED ORAL
Qty: 30 | Refills: 1 | Status: ACTIVE | COMMUNITY
Start: 2024-07-03 | End: 1900-01-01

## 2024-07-03 RX ORDER — NAPROXEN 500 MG/1
500 TABLET ORAL
Qty: 60 | Refills: 1 | Status: ACTIVE | COMMUNITY
Start: 2024-07-03 | End: 1900-01-01

## 2024-07-18 ENCOUNTER — RX RENEWAL (OUTPATIENT)
Age: 54
End: 2024-07-18

## 2024-07-31 ENCOUNTER — APPOINTMENT (OUTPATIENT)
Dept: ORTHOPEDIC SURGERY | Facility: CLINIC | Age: 54
End: 2024-07-31

## 2024-08-12 ENCOUNTER — RX RENEWAL (OUTPATIENT)
Age: 54
End: 2024-08-12

## 2024-08-13 ENCOUNTER — TRANSCRIPTION ENCOUNTER (OUTPATIENT)
Age: 54
End: 2024-08-13

## 2024-08-14 ENCOUNTER — RX RENEWAL (OUTPATIENT)
Age: 54
End: 2024-08-14

## 2025-05-20 ENCOUNTER — APPOINTMENT (OUTPATIENT)
Dept: ORTHOPEDIC SURGERY | Facility: CLINIC | Age: 55
End: 2025-05-20